# Patient Record
Sex: FEMALE | Race: WHITE | NOT HISPANIC OR LATINO | Employment: FULL TIME | URBAN - METROPOLITAN AREA
[De-identification: names, ages, dates, MRNs, and addresses within clinical notes are randomized per-mention and may not be internally consistent; named-entity substitution may affect disease eponyms.]

---

## 2017-03-08 ENCOUNTER — APPOINTMENT (OUTPATIENT)
Dept: PHYSICAL THERAPY | Facility: CLINIC | Age: 47
End: 2017-03-08
Payer: COMMERCIAL

## 2017-03-08 PROCEDURE — 97110 THERAPEUTIC EXERCISES: CPT

## 2017-03-08 PROCEDURE — 97162 PT EVAL MOD COMPLEX 30 MIN: CPT

## 2017-03-13 ENCOUNTER — APPOINTMENT (OUTPATIENT)
Dept: PHYSICAL THERAPY | Facility: CLINIC | Age: 47
End: 2017-03-13
Payer: COMMERCIAL

## 2017-03-13 PROCEDURE — 97140 MANUAL THERAPY 1/> REGIONS: CPT

## 2017-03-13 PROCEDURE — 97110 THERAPEUTIC EXERCISES: CPT

## 2017-03-15 ENCOUNTER — APPOINTMENT (OUTPATIENT)
Dept: PHYSICAL THERAPY | Facility: CLINIC | Age: 47
End: 2017-03-15
Payer: COMMERCIAL

## 2017-03-17 ENCOUNTER — APPOINTMENT (OUTPATIENT)
Dept: PHYSICAL THERAPY | Facility: CLINIC | Age: 47
End: 2017-03-17
Payer: COMMERCIAL

## 2019-05-14 ENCOUNTER — TELEPHONE (OUTPATIENT)
Dept: GASTROENTEROLOGY | Facility: MEDICAL CENTER | Age: 49
End: 2019-05-14

## 2019-05-17 ENCOUNTER — OFFICE VISIT (OUTPATIENT)
Dept: GASTROENTEROLOGY | Facility: HOSPITAL | Age: 49
End: 2019-05-17
Payer: COMMERCIAL

## 2019-05-17 VITALS
BODY MASS INDEX: 24.1 KG/M2 | HEIGHT: 68 IN | DIASTOLIC BLOOD PRESSURE: 77 MMHG | TEMPERATURE: 98.1 F | SYSTOLIC BLOOD PRESSURE: 118 MMHG | WEIGHT: 159 LBS | HEART RATE: 76 BPM

## 2019-05-17 DIAGNOSIS — K59.09 OTHER CONSTIPATION: ICD-10-CM

## 2019-05-17 DIAGNOSIS — R19.7 DIARRHEA, UNSPECIFIED TYPE: Primary | ICD-10-CM

## 2019-05-17 DIAGNOSIS — K21.00 GASTROESOPHAGEAL REFLUX DISEASE WITH ESOPHAGITIS: ICD-10-CM

## 2019-05-17 DIAGNOSIS — R10.33 PERIUMBILICAL ABDOMINAL PAIN: ICD-10-CM

## 2019-05-17 PROCEDURE — 99244 OFF/OP CNSLTJ NEW/EST MOD 40: CPT | Performed by: PHYSICIAN ASSISTANT

## 2019-05-17 RX ORDER — OMEPRAZOLE 40 MG/1
CAPSULE, DELAYED RELEASE ORAL
COMMUNITY
End: 2019-05-17 | Stop reason: ALTCHOICE

## 2019-05-17 RX ORDER — CHLORAL HYDRATE 500 MG
CAPSULE ORAL
COMMUNITY
End: 2019-07-01

## 2019-05-17 RX ORDER — ESTRADIOL 10 UG/1
INSERT VAGINAL WEEKLY
COMMUNITY
Start: 2017-12-04

## 2019-05-17 RX ORDER — CHLORAL HYDRATE 500 MG
1000 CAPSULE ORAL
COMMUNITY
End: 2020-05-25 | Stop reason: ALTCHOICE

## 2019-05-17 RX ORDER — FLUOXETINE 20 MG/1
TABLET, FILM COATED ORAL
COMMUNITY
End: 2019-10-08 | Stop reason: HOSPADM

## 2019-05-17 RX ORDER — FLUOXETINE HYDROCHLORIDE 20 MG/1
CAPSULE ORAL
COMMUNITY
End: 2019-07-01

## 2019-05-17 RX ORDER — FLUOXETINE HYDROCHLORIDE 20 MG/1
CAPSULE ORAL
COMMUNITY
Start: 2018-01-10

## 2019-05-17 RX ORDER — LEVOTHYROXINE SODIUM 0.03 MG/1
TABLET ORAL
COMMUNITY
Start: 2019-03-18 | End: 2019-07-01

## 2019-05-17 RX ORDER — METHOCARBAMOL 500 MG/1
TABLET, FILM COATED ORAL
COMMUNITY
Start: 2019-04-30 | End: 2019-07-01

## 2019-05-17 RX ORDER — METOPROLOL SUCCINATE AND HYDROCHLOROTHIAZIDE 25; 12.5 MG/1; MG/1
TABLET ORAL
COMMUNITY
End: 2019-07-01

## 2019-05-17 RX ORDER — MOMETASONE FUROATE 50 UG/1
2 SPRAY, METERED NASAL
COMMUNITY
Start: 2017-02-20 | End: 2019-10-08 | Stop reason: HOSPADM

## 2019-05-17 RX ORDER — UBIDECARENONE 50 MG
CAPSULE ORAL
COMMUNITY
End: 2020-05-25 | Stop reason: ALTCHOICE

## 2019-05-17 RX ORDER — METRONIDAZOLE 10 MG/G
GEL TOPICAL
COMMUNITY
End: 2019-10-08 | Stop reason: HOSPADM

## 2019-05-17 RX ORDER — UBIDECARENONE 75 MG
100 CAPSULE ORAL
COMMUNITY
End: 2019-10-08 | Stop reason: HOSPADM

## 2019-05-17 RX ORDER — LEVOTHYROXINE SODIUM 0.03 MG/1
TABLET ORAL
COMMUNITY
Start: 2016-12-26 | End: 2019-10-08 | Stop reason: HOSPADM

## 2019-05-17 RX ORDER — ESTRADIOL 0.5 MG/1
10 TABLET ORAL
COMMUNITY
End: 2019-10-08 | Stop reason: HOSPADM

## 2019-05-17 RX ORDER — ESTRADIOL 0.5 MG/1
TABLET ORAL
COMMUNITY
End: 2019-07-01

## 2019-05-17 RX ORDER — ERGOCALCIFEROL (VITAMIN D2) 1250 MCG
50000 CAPSULE ORAL
COMMUNITY
End: 2019-10-08 | Stop reason: HOSPADM

## 2019-05-17 RX ORDER — METOPROLOL SUCCINATE 25 MG/1
TABLET, EXTENDED RELEASE ORAL
COMMUNITY
Start: 2018-01-10

## 2019-05-17 RX ORDER — PANTOPRAZOLE SODIUM 40 MG/1
TABLET, DELAYED RELEASE ORAL
COMMUNITY
Start: 2017-12-10 | End: 2019-10-08

## 2019-05-17 RX ORDER — CYCLOBENZAPRINE HCL 5 MG
TABLET ORAL
COMMUNITY
Start: 2017-12-26 | End: 2020-05-25 | Stop reason: ALTCHOICE

## 2019-05-17 RX ORDER — DEXTROMETHORPHAN HYDROBROMIDE AND PROMETHAZINE HYDROCHLORIDE 15; 6.25 MG/5ML; MG/5ML
5 SYRUP ORAL
COMMUNITY
Start: 2018-01-10 | End: 2019-10-08 | Stop reason: HOSPADM

## 2019-05-17 RX ORDER — METHOCARBAMOL 500 MG/1
TABLET, FILM COATED ORAL
COMMUNITY
Start: 2019-04-30 | End: 2020-05-25 | Stop reason: ALTCHOICE

## 2019-05-17 RX ORDER — CLOBETASOL PROPIONATE 0.5 MG/G
CREAM TOPICAL
COMMUNITY
Start: 2016-11-03 | End: 2019-10-08 | Stop reason: HOSPADM

## 2019-05-24 ENCOUNTER — TELEPHONE (OUTPATIENT)
Dept: GASTROENTEROLOGY | Facility: MEDICAL CENTER | Age: 49
End: 2019-05-24

## 2019-05-24 ENCOUNTER — HOSPITAL ENCOUNTER (OUTPATIENT)
Dept: PERIOP | Facility: HOSPITAL | Age: 49
Discharge: HOME | End: 2019-05-24

## 2019-07-01 ENCOUNTER — ANESTHESIA EVENT (OUTPATIENT)
Dept: PERIOP | Facility: HOSPITAL | Age: 49
End: 2019-07-01

## 2019-07-02 ENCOUNTER — HOSPITAL ENCOUNTER (OUTPATIENT)
Dept: PERIOP | Facility: HOSPITAL | Age: 49
Setting detail: OUTPATIENT SURGERY
Discharge: HOME | End: 2019-07-02
Attending: INTERNAL MEDICINE | Admitting: INTERNAL MEDICINE
Payer: COMMERCIAL

## 2019-07-02 ENCOUNTER — ANESTHESIA (OUTPATIENT)
Dept: PERIOP | Facility: HOSPITAL | Age: 49
End: 2019-07-02

## 2019-07-02 VITALS
DIASTOLIC BLOOD PRESSURE: 70 MMHG | HEART RATE: 74 BPM | HEIGHT: 68 IN | TEMPERATURE: 98.1 F | SYSTOLIC BLOOD PRESSURE: 132 MMHG | OXYGEN SATURATION: 98 % | BODY MASS INDEX: 24.25 KG/M2 | RESPIRATION RATE: 20 BRPM | WEIGHT: 160 LBS

## 2019-07-02 DIAGNOSIS — K21.00 GASTROESOPHAGEAL REFLUX DISEASE WITH ESOPHAGITIS: ICD-10-CM

## 2019-07-02 DIAGNOSIS — R19.7 DIARRHEA, UNSPECIFIED TYPE: ICD-10-CM

## 2019-07-02 PROCEDURE — 43239 EGD BIOPSY SINGLE/MULTIPLE: CPT | Performed by: INTERNAL MEDICINE

## 2019-07-02 PROCEDURE — 88305 TISSUE EXAM BY PATHOLOGIST: CPT | Performed by: PATHOLOGY

## 2019-07-02 PROCEDURE — 45380 COLONOSCOPY AND BIOPSY: CPT | Performed by: INTERNAL MEDICINE

## 2019-07-02 RX ORDER — PROPOFOL 10 MG/ML
INJECTION, EMULSION INTRAVENOUS AS NEEDED
Status: DISCONTINUED | OUTPATIENT
Start: 2019-07-02 | End: 2019-07-02 | Stop reason: SURG

## 2019-07-02 RX ORDER — SODIUM CHLORIDE, SODIUM LACTATE, POTASSIUM CHLORIDE, CALCIUM CHLORIDE 600; 310; 30; 20 MG/100ML; MG/100ML; MG/100ML; MG/100ML
125 INJECTION, SOLUTION INTRAVENOUS CONTINUOUS
Status: DISCONTINUED | OUTPATIENT
Start: 2019-07-02 | End: 2019-07-02

## 2019-07-02 RX ORDER — LIDOCAINE HYDROCHLORIDE 10 MG/ML
INJECTION, SOLUTION INFILTRATION; PERINEURAL AS NEEDED
Status: DISCONTINUED | OUTPATIENT
Start: 2019-07-02 | End: 2019-07-02 | Stop reason: SURG

## 2019-07-02 RX ADMIN — PROPOFOL 50 MG: 10 INJECTION, EMULSION INTRAVENOUS at 08:48

## 2019-07-02 RX ADMIN — SODIUM CHLORIDE, SODIUM LACTATE, POTASSIUM CHLORIDE, AND CALCIUM CHLORIDE 125 ML/HR: .6; .31; .03; .02 INJECTION, SOLUTION INTRAVENOUS at 07:48

## 2019-07-02 RX ADMIN — PROPOFOL 100 MG: 10 INJECTION, EMULSION INTRAVENOUS at 08:29

## 2019-07-02 RX ADMIN — PROPOFOL 50 MG: 10 INJECTION, EMULSION INTRAVENOUS at 08:35

## 2019-07-02 RX ADMIN — PROPOFOL 50 MG: 10 INJECTION, EMULSION INTRAVENOUS at 08:44

## 2019-07-02 RX ADMIN — LIDOCAINE HYDROCHLORIDE 100 MG: 10 INJECTION, SOLUTION INFILTRATION; PERINEURAL at 08:29

## 2019-07-02 RX ADMIN — PROPOFOL 50 MG: 10 INJECTION, EMULSION INTRAVENOUS at 08:58

## 2019-07-02 RX ADMIN — PROPOFOL 50 MG: 10 INJECTION, EMULSION INTRAVENOUS at 08:41

## 2019-07-02 RX ADMIN — PROPOFOL 50 MG: 10 INJECTION, EMULSION INTRAVENOUS at 08:32

## 2019-07-02 RX ADMIN — PROPOFOL 50 MG: 10 INJECTION, EMULSION INTRAVENOUS at 08:53

## 2019-07-02 RX ADMIN — PROPOFOL 50 MG: 10 INJECTION, EMULSION INTRAVENOUS at 08:38

## 2019-07-02 NOTE — ANESTHESIA POSTPROCEDURE EVALUATION
Post-Op Assessment Note    CV Status:  Stable       Mental Status:  Sleepy   Hydration Status:  Stable   PONV Controlled:  None   Airway Patency:  Patent   Post Op Vitals Reviewed: Yes      Staff: CRNA           BP   93/61   Temp     Pulse  65   Resp   16   SpO2   98%

## 2019-07-02 NOTE — ANESTHESIA PREPROCEDURE EVALUATION
Review of Systems/Medical History  Patient summary reviewed  Chart reviewed  History of anesthetic complications difficult airway    Cardiovascular  EKG reviewed, Valvular heart disease , mitral valve prolapse,    Pulmonary  Negative pulmonary ROS        GI/Hepatic    GERD , Bowel prep            Endo/Other  History of thyroid disease ,      GYN       Hematology  Negative hematology ROS      Musculoskeletal  Negative musculoskeletal ROS        Neurology  Negative neurology ROS      Psychology   Negative psychology ROS              Physical Exam    Airway    Mallampati score: II  TM Distance: <3 FB  Neck ROM: full     Dental   No notable dental hx     Cardiovascular      Pulmonary      Other Findings        Anesthesia Plan  ASA Score- 2     Anesthesia Type- IV sedation with anesthesia with ASA Monitors  Additional Monitors:   Airway Plan:     Comment: Pt states difficult intubation history: difficulty during lap ankur  Plan Factors-  Patient did not smoke on day of surgery  Induction-     Postoperative Plan-     Informed Consent- Anesthetic plan and risks discussed with patient  I personally reviewed this patient with the CRNA  Discussed and agreed on the Anesthesia Plan with the CRNA  Drew Mora

## 2019-07-02 NOTE — H&P
History and Physical - [facility]  Kaylin Tafoya 47 y o  female MRN: [id number]                  HPI: Kaylin Tafoya is a 47y o  year old female who presents for EGD and colonoscopy  REVIEW OF SYSTEMS: Per the HPI, and otherwise unremarkable  Historical Information   Past Medical History:   Diagnosis Date    Colon polyp     stomach polyps    Disease of thyroid gland     GERD (gastroesophageal reflux disease)     MVP (mitral valve prolapse)      Past Surgical History:   Procedure Laterality Date    BACK SURGERY      CHOLECYSTECTOMY      HYSTERECTOMY      TRANSURETHRAL RESECTION OF BLADDER       Social History   Social History     Substance and Sexual Activity   Alcohol Use Yes    Comment: occasional     Social History     Substance and Sexual Activity   Drug Use Never     Social History     Tobacco Use   Smoking Status Never Smoker   Smokeless Tobacco Never Used     History reviewed  No pertinent family history  Meds/Allergies       (Not in a hospital admission)    No Known Allergies    Objective     Blood pressure 132/75, pulse 71, temperature (!) 97 2 °F (36 2 °C), temperature source Tympanic, resp  rate 18, height 5' 8" (1 727 m), weight 72 6 kg (160 lb), SpO2 98 %  PHYSICAL EXAM    Gen: NAD  CV: RRR  CHEST: Clear  ABD: soft, NT/ND  EXT: no edema      ASSESSMENT/PLAN:  This is a 47y o  year old female here for EGD and colonoscopy, and she is stable and optimized for her procedure

## 2019-08-30 ENCOUNTER — TRANSCRIBE ORDERS (OUTPATIENT)
Dept: ADMINISTRATIVE | Facility: HOSPITAL | Age: 49
End: 2019-08-30

## 2019-08-30 ENCOUNTER — APPOINTMENT (OUTPATIENT)
Dept: LAB | Facility: HOSPITAL | Age: 49
End: 2019-08-30
Payer: COMMERCIAL

## 2019-08-30 DIAGNOSIS — Z00.8 OTHER SPECIFIED GENERAL MEDICAL EXAMINATION: Primary | ICD-10-CM

## 2019-08-30 DIAGNOSIS — Z00.8 OTHER SPECIFIED GENERAL MEDICAL EXAMINATION: ICD-10-CM

## 2019-08-30 DIAGNOSIS — E03.9 ACQUIRED HYPOTHYROIDISM: ICD-10-CM

## 2019-08-30 DIAGNOSIS — I10 ESSENTIAL HYPERTENSION: Primary | ICD-10-CM

## 2019-08-30 DIAGNOSIS — I10 ESSENTIAL HYPERTENSION: ICD-10-CM

## 2019-08-30 DIAGNOSIS — Z00.8 HEALTH EXAMINATION IN POPULATION SURVEYS: ICD-10-CM

## 2019-08-30 LAB
ALBUMIN SERPL BCP-MCNC: 4.2 G/DL (ref 3.5–5)
ALP SERPL-CCNC: 48 U/L (ref 46–116)
ALT SERPL W P-5'-P-CCNC: 18 U/L (ref 12–78)
ANION GAP SERPL CALCULATED.3IONS-SCNC: 7 MMOL/L (ref 4–13)
AST SERPL W P-5'-P-CCNC: 14 U/L (ref 5–45)
BASOPHILS # BLD AUTO: 0.06 THOUSANDS/ΜL (ref 0–0.1)
BASOPHILS NFR BLD AUTO: 1 % (ref 0–1)
BILIRUB SERPL-MCNC: 0.4 MG/DL (ref 0.2–1)
BUN SERPL-MCNC: 12 MG/DL (ref 5–25)
CALCIUM SERPL-MCNC: 9.3 MG/DL (ref 8.3–10.1)
CHLORIDE SERPL-SCNC: 99 MMOL/L (ref 100–108)
CHOLEST SERPL-MCNC: 266 MG/DL (ref 50–200)
CHOLEST SERPL-MCNC: 269 MG/DL (ref 50–200)
CO2 SERPL-SCNC: 30 MMOL/L (ref 21–32)
CREAT SERPL-MCNC: 0.66 MG/DL (ref 0.6–1.3)
EOSINOPHIL # BLD AUTO: 0.25 THOUSAND/ΜL (ref 0–0.61)
EOSINOPHIL NFR BLD AUTO: 2 % (ref 0–6)
ERYTHROCYTE [DISTWIDTH] IN BLOOD BY AUTOMATED COUNT: 12.2 % (ref 11.6–15.1)
EST. AVERAGE GLUCOSE BLD GHB EST-MCNC: 103 MG/DL
GFR SERPL CREATININE-BSD FRML MDRD: 103 ML/MIN/1.73SQ M
GLUCOSE SERPL-MCNC: 88 MG/DL (ref 65–140)
HBA1C MFR BLD: 5.2 % (ref 4.2–6.3)
HCT VFR BLD AUTO: 40.8 % (ref 34.8–46.1)
HDLC SERPL-MCNC: 51 MG/DL (ref 40–60)
HDLC SERPL-MCNC: 52 MG/DL (ref 40–60)
HGB BLD-MCNC: 13.7 G/DL (ref 11.5–15.4)
IMM GRANULOCYTES # BLD AUTO: 0.21 THOUSAND/UL (ref 0–0.2)
IMM GRANULOCYTES NFR BLD AUTO: 2 % (ref 0–2)
LDLC SERPL CALC-MCNC: 174 MG/DL (ref 0–100)
LDLC SERPL CALC-MCNC: 175 MG/DL (ref 0–100)
LYMPHOCYTES # BLD AUTO: 3.03 THOUSANDS/ΜL (ref 0.6–4.47)
LYMPHOCYTES NFR BLD AUTO: 26 % (ref 14–44)
MCH RBC QN AUTO: 32.9 PG (ref 26.8–34.3)
MCHC RBC AUTO-ENTMCNC: 33.6 G/DL (ref 31.4–37.4)
MCV RBC AUTO: 98 FL (ref 82–98)
MONOCYTES # BLD AUTO: 0.78 THOUSAND/ΜL (ref 0.17–1.22)
MONOCYTES NFR BLD AUTO: 7 % (ref 4–12)
NEUTROPHILS # BLD AUTO: 7.35 THOUSANDS/ΜL (ref 1.85–7.62)
NEUTS SEG NFR BLD AUTO: 62 % (ref 43–75)
NONHDLC SERPL-MCNC: 215 MG/DL
NONHDLC SERPL-MCNC: 217 MG/DL
NRBC BLD AUTO-RTO: 0 /100 WBCS
PLATELET # BLD AUTO: 188 THOUSANDS/UL (ref 149–390)
PMV BLD AUTO: 9.9 FL (ref 8.9–12.7)
POTASSIUM SERPL-SCNC: 3.9 MMOL/L (ref 3.5–5.3)
PROT SERPL-MCNC: 7.7 G/DL (ref 6.4–8.2)
RBC # BLD AUTO: 4.17 MILLION/UL (ref 3.81–5.12)
SODIUM SERPL-SCNC: 136 MMOL/L (ref 136–145)
TRIGL SERPL-MCNC: 207 MG/DL
TRIGL SERPL-MCNC: 210 MG/DL
TSH SERPL DL<=0.05 MIU/L-ACNC: 2.73 UIU/ML (ref 0.36–3.74)
WBC # BLD AUTO: 11.68 THOUSAND/UL (ref 4.31–10.16)

## 2019-08-30 PROCEDURE — 80061 LIPID PANEL: CPT

## 2019-08-30 PROCEDURE — 80053 COMPREHEN METABOLIC PANEL: CPT

## 2019-08-30 PROCEDURE — 85025 COMPLETE CBC W/AUTO DIFF WBC: CPT

## 2019-08-30 PROCEDURE — 84443 ASSAY THYROID STIM HORMONE: CPT

## 2019-08-30 PROCEDURE — 36415 COLL VENOUS BLD VENIPUNCTURE: CPT

## 2019-08-30 PROCEDURE — 83036 HEMOGLOBIN GLYCOSYLATED A1C: CPT

## 2019-10-04 RX ORDER — CEPHALEXIN 500 MG/1
CAPSULE ORAL
COMMUNITY
Start: 2019-07-18 | End: 2019-10-08 | Stop reason: HOSPADM

## 2019-10-04 RX ORDER — ERYTHROMYCIN 5 MG/G
OINTMENT OPHTHALMIC
COMMUNITY
Start: 2019-07-05 | End: 2019-10-08 | Stop reason: HOSPADM

## 2019-10-08 ENCOUNTER — OFFICE VISIT (OUTPATIENT)
Dept: GASTROENTEROLOGY | Facility: CLINIC | Age: 49
End: 2019-10-08
Payer: COMMERCIAL

## 2019-10-08 VITALS
TEMPERATURE: 98.3 F | BODY MASS INDEX: 25.16 KG/M2 | SYSTOLIC BLOOD PRESSURE: 133 MMHG | HEIGHT: 68 IN | WEIGHT: 166 LBS | HEART RATE: 72 BPM | DIASTOLIC BLOOD PRESSURE: 76 MMHG

## 2019-10-08 DIAGNOSIS — K21.9 GASTROESOPHAGEAL REFLUX DISEASE WITHOUT ESOPHAGITIS: Primary | ICD-10-CM

## 2019-10-08 DIAGNOSIS — K58.2 IRRITABLE BOWEL SYNDROME WITH BOTH CONSTIPATION AND DIARRHEA: ICD-10-CM

## 2019-10-08 PROCEDURE — 99214 OFFICE O/P EST MOD 30 MIN: CPT | Performed by: PHYSICIAN ASSISTANT

## 2019-10-08 RX ORDER — ESOMEPRAZOLE MAGNESIUM 40 MG/1
40 CAPSULE, DELAYED RELEASE ORAL DAILY
Qty: 30 CAPSULE | Refills: 3 | Status: SHIPPED | OUTPATIENT
Start: 2019-10-08 | End: 2020-03-09

## 2019-10-08 RX ORDER — FAMOTIDINE 20 MG/1
20 TABLET, FILM COATED ORAL DAILY
COMMUNITY
End: 2020-05-25 | Stop reason: ALTCHOICE

## 2019-10-08 NOTE — PROGRESS NOTES
[facility] - Outpatient Follow-up Note  Brenda Robledo 47 y o  female MRN: [id number]  Encounter: [id number]          ASSESSMENT AND PLAN:      1  Gastroesophageal reflux disease without esophagitis:  She continues to complain of acid reflux symptoms  She is currently on Protonix 40 milligrams daily as well as Pepcid  She did undergo a recent EGD in May of 2019 that was negative except for a few gastric polyps with benign biopsies  Given her persistent symptoms despite PPI and H2 blocker and negative EGD, would recommend pH study for further evaluation off PPI to evaluate for non-acid vs acid reflux  She understands and agrees for procedure  In the meantime we could try switching her PPI to Nexium  - 24hr pH testing; Future  - esomeprazole (NexIUM) 40 MG capsule; Take 1 capsule (40 mg total) by mouth daily  Dispense: 30 capsule; Refill: 3  -long term side effects of PPIs were discussed    2  IBS-M:   She continues to complain of alternating bowel habits between diarrhea and constipation  She states this has been ongoing for about 20 years ever since she had her son  She did undergo colonoscopy May of 2019 that was normal   Repeat was recommended in 10 years  She does take a daily probiotic, which sounds like VSL and states she watches her diet  -low FODMAP diet handout given and discussed  -continue daily probiotic  -may use Imodium and MiraLax as needed for alternating bowel habits  -denies cramping and need for anti spasmodic    ______________________________________________________________________    SUBJECTIVE:  Brenda Robledo is a 46 yo female who is here for follow-up after EGD and colonoscopy as well as GERD  She states that she has a longstanding history of abdominal pain with alternating bowel habits with diarrhea and constipation  She thinks that she has irritable bowel syndrome  She denies significant abdominal cramping    She states that she does eat a "healthy" diet mostly consisting of meat and vegetables  She does drink the significant amount of caffeine  She also complains of heartburn/acid reflux symptoms  She has been on Protonix 40 milligrams daily for a long period of time and recently added Pepcid for worsening symptoms  She recently had an EGD and colonoscopy in May of 2019  EGD was normal except for a few gastric polyps with negative biopsies  Her colonoscopy was also normal and repeat was recommended in 10 years  Given her persistent symptoms despite PPI a normal EGD, would recommend pH study for further evaluation as this may be non-acid reflu      REVIEW OF SYSTEMS IS OTHERWISE NEGATIVE  Historical Information   Past Medical History:   Diagnosis Date    Colon polyp     stomach polyps    Disease of thyroid gland     GERD (gastroesophageal reflux disease)     MVP (mitral valve prolapse)      Past Surgical History:   Procedure Laterality Date    BACK SURGERY      CHOLECYSTECTOMY      HYSTERECTOMY      TRANSURETHRAL RESECTION OF BLADDER       Social History   Social History     Substance and Sexual Activity   Alcohol Use Yes    Comment: occasional     Social History     Substance and Sexual Activity   Drug Use Never     Social History     Tobacco Use   Smoking Status Never Smoker   Smokeless Tobacco Never Used     No family history on file      Meds/Allergies       Current Outpatient Medications:     Calcium-Magnesium-Zinc 167-83-8 MG TABS    Carboxymethylcell-Hypromellose (GENTEAL OP)    cyclobenzaprine (FLEXERIL) 5 mg tablet    estradiol (YUVAFEM) 10 MCG TABS vaginal tablet    famotidine (PEPCID) 20 mg tablet    FLUoxetine (PROzac) 20 mg capsule    levothyroxine 25 mcg/mL SUSP    methocarbamol (ROBAXIN) 500 mg tablet    metoprolol succinate (TOPROL-XL) 25 mg 24 hr tablet    Omega-3 Fatty Acids (FISH OIL) 1,000 mg    Red Yeast Rice 600 MG TABS    esomeprazole (NexIUM) 40 MG capsule    No Known Allergies        Objective     Blood pressure 133/76, pulse 72, temperature 98 3 °F (36 8 °C), temperature source Tympanic, height 5' 8" (1 727 m), weight 75 3 kg (166 lb)  Body mass index is 25 24 kg/m²  PHYSICAL EXAM:      General Appearance:   Alert, cooperative, no distress   HEENT:   Normocephalic, atraumatic, anicteric  Right eye exhibits no discharge  Left eye exhibits no discharge  No scleral icterus    Neck:  Supple, symmetrical, trachea midline, no stridor    Lungs:   Clear to auscultation bilaterally; no rales, rhonchi or wheezing; respirations unlabored    Heart[de-identified]   Regular rate and rhythm; no murmur, rub, or gallop  Abdomen:   Soft, mild epigastric discomfort, non-distended; normal bowel sounds; no masses, no organomegaly    Genitalia:   Deferred    Rectal:   Deferred    Extremities:  No cyanosis, clubbing or edema        Skin:  No jaundice, rashes, or lesions          Lab Results:   No visits with results within 1 Day(s) from this visit  Latest known visit with results is:   Appointment on 08/30/2019   Component Date Value    Hemoglobin A1C 08/30/2019 5 2     EAG 08/30/2019 103     Cholesterol 08/30/2019 269*    Triglycerides 08/30/2019 210*    HDL, Direct 08/30/2019 52     LDL Calculated 08/30/2019 175*    Non-HDL-Chol (CHOL-HDL) 08/30/2019 217          Radiology Results:   No results found

## 2019-10-08 NOTE — PATIENT INSTRUCTIONS
Gave a script for 24 hr PH testing  Central scheduling will call her to schedule this test   Patient will call when this gets scheduled so I can make follow up appointment

## 2019-11-11 DIAGNOSIS — K58.2 IRRITABLE BOWEL SYNDROME WITH BOTH CONSTIPATION AND DIARRHEA: ICD-10-CM

## 2019-11-11 DIAGNOSIS — K21.9 GASTROESOPHAGEAL REFLUX DISEASE WITHOUT ESOPHAGITIS: Primary | ICD-10-CM

## 2020-02-27 ENCOUNTER — TELEPHONE (OUTPATIENT)
Dept: UROLOGY | Facility: MEDICAL CENTER | Age: 50
End: 2020-02-27

## 2020-02-27 NOTE — TELEPHONE ENCOUNTER
Contacted and spoke with patient to schedule an appointment  Patient states 20 years ago she was diagnosed with bladder cancer  Low grade  Patient has not had a recurrence since that time  Patient was seeing Dr Sharyle Chen but patient's insurance changed and Dr Sharyle Chen does not take her insurance  Patient wishes YOLIE to Dr Eladio Colvin  Patient will obtain her records and have them fax  Patient scheduled 04/13/2020 at [address] [place] [address] with Dr Eladio Colvin in the [place] office

## 2020-02-27 NOTE — TELEPHONE ENCOUNTER
Complaints/diagnosis  New pt had transitional cell carcinoma over   Insurance:  [facility]  History of Cancer:  Low dose transition cell carcinoma 20 years ago  Previous Urologist:  Dr Rukhsana Davila and medical records are lost and dr Espinal Gist in [address] and pt will fax these records  Outside testing/where:  no  what kind of test:  no  Records requested/where:   In [facility]  Preferred Location  [place]

## 2020-02-27 NOTE — TELEPHONE ENCOUNTER
This is a new patient and wants to be seen in [place]  She had low dose of cancer 20 years ago for transitional cell carcinoma  Please call her back to schedule at [phone]  I was not sure if you need her to get any blood work before her appointment

## 2020-03-03 NOTE — TELEPHONE ENCOUNTER
Prior records received in the [facility] office and scanned to [address] [place] [address] Rd on 3-3-20

## 2020-03-06 DIAGNOSIS — K21.9 GASTROESOPHAGEAL REFLUX DISEASE WITHOUT ESOPHAGITIS: ICD-10-CM

## 2020-03-09 RX ORDER — ESOMEPRAZOLE MAGNESIUM 40 MG/1
CAPSULE, DELAYED RELEASE ORAL
Qty: 30 CAPSULE | Refills: 2 | Status: SHIPPED | OUTPATIENT
Start: 2020-03-09 | End: 2020-07-24

## 2020-04-03 ENCOUNTER — TELEPHONE (OUTPATIENT)
Dept: UROLOGY | Facility: CLINIC | Age: 50
End: 2020-04-03

## 2020-04-08 ENCOUNTER — TELEPHONE (OUTPATIENT)
Dept: UROLOGY | Facility: CLINIC | Age: 50
End: 2020-04-08

## 2020-04-09 ENCOUNTER — TELEPHONE (OUTPATIENT)
Dept: UROLOGY | Facility: CLINIC | Age: 50
End: 2020-04-09

## 2020-04-13 ENCOUNTER — TELEMEDICINE (OUTPATIENT)
Dept: UROLOGY | Facility: MEDICAL CENTER | Age: 50
End: 2020-04-13
Payer: COMMERCIAL

## 2020-04-13 VITALS — WEIGHT: 163 LBS | HEIGHT: 68 IN | BODY MASS INDEX: 24.71 KG/M2

## 2020-04-13 DIAGNOSIS — Z85.51 HISTORY OF BLADDER CANCER: Primary | ICD-10-CM

## 2020-04-13 PROCEDURE — 99201 PR OFFICE OUTPATIENT NEW 10 MINUTES: CPT | Performed by: UROLOGY

## 2020-04-13 RX ORDER — ICOSAPENT ETHYL 1000 MG/1
CAPSULE ORAL
COMMUNITY
End: 2020-05-25 | Stop reason: ALTCHOICE

## 2020-04-13 RX ORDER — ATORVASTATIN CALCIUM 10 MG/1
TABLET, FILM COATED ORAL
COMMUNITY
Start: 2020-02-27

## 2020-04-13 RX ORDER — MAG HYDROX/ALUMINUM HYD/SIMETH 400-400-40
SUSPENSION, ORAL (FINAL DOSE FORM) ORAL
COMMUNITY
Start: 2019-01-01

## 2020-05-25 ENCOUNTER — OFFICE VISIT (OUTPATIENT)
Dept: URGENT CARE | Facility: CLINIC | Age: 50
End: 2020-05-25
Payer: COMMERCIAL

## 2020-05-25 VITALS
DIASTOLIC BLOOD PRESSURE: 88 MMHG | OXYGEN SATURATION: 99 % | HEART RATE: 82 BPM | BODY MASS INDEX: 25.16 KG/M2 | WEIGHT: 166 LBS | SYSTOLIC BLOOD PRESSURE: 148 MMHG | RESPIRATION RATE: 20 BRPM | HEIGHT: 68 IN | TEMPERATURE: 99.7 F

## 2020-05-25 DIAGNOSIS — H61.22 IMPACTED CERUMEN OF LEFT EAR: Primary | ICD-10-CM

## 2020-05-25 DIAGNOSIS — H69.83 EUSTACHIAN TUBE DYSFUNCTION, BILATERAL: ICD-10-CM

## 2020-05-25 PROCEDURE — 69209 REMOVE IMPACTED EAR WAX UNI: CPT | Performed by: NURSE PRACTITIONER

## 2020-05-25 PROCEDURE — G0381 LEV 2 HOSP TYPE B ED VISIT: HCPCS | Performed by: NURSE PRACTITIONER

## 2020-07-17 ENCOUNTER — TELEPHONE (OUTPATIENT)
Dept: GASTROENTEROLOGY | Facility: CLINIC | Age: 50
End: 2020-07-17

## 2020-07-17 NOTE — TELEPHONE ENCOUNTER
----- Message from Lencho Lafleur sent at 7/17/2020 11:16 AM EDT -----  [place] patient  Please call and assist in scheduling FU     ----- Message -----  [facility]  Sent: 7/15/2020  11:31 AM EDT  To: , #    Please contact patient to schedule office visit/virtual visit in the next 1-2 weeks

## 2020-07-21 ENCOUNTER — TELEPHONE (OUTPATIENT)
Dept: GASTROENTEROLOGY | Facility: CLINIC | Age: 50
End: 2020-07-21

## 2020-07-21 DIAGNOSIS — K21.9 GASTROESOPHAGEAL REFLUX DISEASE WITHOUT ESOPHAGITIS: ICD-10-CM

## 2020-07-21 NOTE — TELEPHONE ENCOUNTER
Scheduled  ----- Message from Liz Maria sent at 7/17/2020  1:21 PM EDT -----  lmom for patient to contact office  ----- Message -----  From: Pina Serrano  Sent: 7/17/2020  11:16 AM EDT  To: [facility] [place] [facility]    [place] patient  Please call and assist in scheduling FU     ----- Message -----  From: Otoniel Jorgensen PA-C  Sent: 7/15/2020  11:31 AM EDT  To: , #    Please contact patient to schedule office visit/virtual visit in the next 1-2 weeks

## 2020-07-24 RX ORDER — ESOMEPRAZOLE MAGNESIUM 40 MG/1
CAPSULE, DELAYED RELEASE ORAL
Qty: 90 CAPSULE | Refills: 0 | Status: SHIPPED | OUTPATIENT
Start: 2020-07-24 | End: 2021-01-29 | Stop reason: SDUPTHER

## 2020-08-03 RX ORDER — ICOSAPENT ETHYL 1000 MG/1
2 CAPSULE ORAL 2 TIMES DAILY
COMMUNITY
Start: 2019-12-06

## 2020-08-04 ENCOUNTER — OFFICE VISIT (OUTPATIENT)
Dept: GASTROENTEROLOGY | Facility: CLINIC | Age: 50
End: 2020-08-04
Payer: COMMERCIAL

## 2020-08-04 VITALS
BODY MASS INDEX: 25.13 KG/M2 | SYSTOLIC BLOOD PRESSURE: 115 MMHG | HEIGHT: 68 IN | DIASTOLIC BLOOD PRESSURE: 77 MMHG | HEART RATE: 68 BPM | WEIGHT: 165.8 LBS | TEMPERATURE: 98.8 F

## 2020-08-04 DIAGNOSIS — K21.9 GASTROESOPHAGEAL REFLUX DISEASE WITHOUT ESOPHAGITIS: ICD-10-CM

## 2020-08-04 DIAGNOSIS — K58.2 IRRITABLE BOWEL SYNDROME WITH BOTH CONSTIPATION AND DIARRHEA: Primary | ICD-10-CM

## 2020-08-04 PROCEDURE — 99214 OFFICE O/P EST MOD 30 MIN: CPT | Performed by: PHYSICIAN ASSISTANT

## 2020-08-04 RX ORDER — DICYCLOMINE HCL 20 MG
20 TABLET ORAL EVERY 6 HOURS
Qty: 60 TABLET | Refills: 2 | Status: SHIPPED | OUTPATIENT
Start: 2020-08-04

## 2020-08-04 RX ORDER — SUCRALFATE ORAL 1 G/10ML
1 SUSPENSION ORAL 4 TIMES DAILY
Qty: 420 ML | Refills: 2 | Status: SHIPPED | OUTPATIENT
Start: 2020-08-04 | End: 2021-07-13 | Stop reason: SDUPTHER

## 2020-08-04 RX ORDER — LEVOTHYROXINE SODIUM 0.03 MG/1
TABLET ORAL
COMMUNITY
Start: 2020-07-22

## 2020-08-04 NOTE — PROGRESS NOTES
[facility] - Outpatient Follow-up Note  Liban Ureña 45 y o  female MRN: [id number]  Encounter: [id number]          ASSESSMENT AND PLAN:      1  Irritable bowel syndrome with both constipation and diarrhea: she has alternating bowel habits and occasional abdominal cramping  She had normal colonoscopy in 5/2019 and this is likely secondary to functional pain/IBS  She admits to recently being stressed due to covid and her  being laid off and she had abdominal pain, bloating and decreased appetite  Fortunately these symptoms have resolved  She would like to have bentyl on hand for the future  - dicyclomine (BENTYL) 20 mg tablet; Take 1 tablet (20 mg total) by mouth every 6 (six) hours  Dispense: 60 tablet; Refill: 2  -continue daily probiotic  -low fod map diet  -stress reduction including exercise and meditation or yoga    2  Gastroesophageal reflux disease without esophagitis: she is currently on nexium 40 mg daily  this was changed from protonix 40mg daily in 10/19  We were planning for pH study but her symptoms were improved with nexium  She would like to have carafate on hand for flares  - sucralfate (CARAFATE) 1 g/10 mL suspension; Take 10 mL (1 g total) by mouth 4 (four) times a day  Dispense: 420 mL; Refill: 2  -continue nexium 40mg daily   -gerd diet    F/u 6 months    ______________________________________________________________________    SUBJECTIVE:  Liban Ureña is a 79-year-old female who for follow-up of acid reflux and irritable bowel syndrome  She was last seen in October 2013 and was suffering from acid reflux  She was on Protonix 40 mg daily at that time and had added Pepcid for her symptoms  Her symptoms were stoma well controlled  We switched her to Nexium at that time and plan for pH study  After switching to Nexium, her symptoms completely resolved and she never needed to get the pH study completed    She was doing well up until a few weeks ago when she states she was significantly stressed due to [place] as well as her  being laid off because of COVID and she experienced abdominal bloating, pain, change in bowel habits, poor appetite  She made this appointment secondary to the symptoms but fortunately these have all now resolved  This may have been secondary to a virus or consider irritable bowel syndrome flare in the setting of stress  She no longer is experiencing the symptoms and denies any dysphagia, nausea, vomiting, abdominal pain, change in bowel habits, melena or hematochezia  Her last EGD and colonoscopy was in May 2019  EGD showed some gastric problems with negative biopsies  Colonoscopy was also normal and repeat was recommended in 10 years  REVIEW OF SYSTEMS IS OTHERWISE NEGATIVE        Historical Information   Past Medical History:   Diagnosis Date    Colon polyp     stomach polyps    Disease of thyroid gland     GERD (gastroesophageal reflux disease)     Hypertension     MVP (mitral valve prolapse)      Past Surgical History:   Procedure Laterality Date    BACK SURGERY      CHOLECYSTECTOMY      HYSTERECTOMY      TRANSURETHRAL RESECTION OF BLADDER       Social History   Social History     Substance and Sexual Activity   Alcohol Use Yes    Frequency: Monthly or less    Comment: occasional     Social History     Substance and Sexual Activity   Drug Use Never     Social History     Tobacco Use   Smoking Status Never Smoker   Smokeless Tobacco Never Used     Family History   Problem Relation Age of Onset    Diabetes Mother     Diabetes Father     Hypertension Father     Hyperlipidemia Father        Meds/Allergies       Current Outpatient Medications:     atorvastatin (LIPITOR) 10 mg tablet    Calcium-Magnesium-Zinc 167-83-8 MG TABS    Cholecalciferol (VITAMIN D3) 125 MCG (5000 UT) CAPS    esomeprazole (NexIUM) 40 MG capsule    estradiol (YUVAFEM) 10 MCG TABS vaginal tablet    Evening Primrose Oil 1000 MG CAPS   FLUoxetine (PROzac) 20 mg capsule    Icosapent Ethyl 1 g CAPS    levothyroxine 25 mcg/mL SUSP    LEVOTHYROXINE SODIUM PO    metoprolol succinate (TOPROL-XL) 25 mg 24 hr tablet    dicyclomine (BENTYL) 20 mg tablet    levothyroxine 25 mcg tablet    sucralfate (CARAFATE) 1 g/10 mL suspension    No Known Allergies        Objective     Blood pressure 115/77, pulse 68, temperature 98 8 °F (37 1 °C), temperature source Tympanic, height 5' 8", weight 75 2 kg (165 lb 12 8 oz)  Body mass index is 25 21 kg/m²  PHYSICAL EXAM:      General Appearance:   Alert, cooperative, no distress   HEENT:   Normocephalic, atraumatic, anicteric  Right eye exhibits no discharge  Left eye exhibits no discharge  No scleral icterus     Neck:  Supple, symmetrical, trachea midline, no stridor    Lungs:   Clear to auscultation bilaterally; no rales, rhonchi or wheezing; respirations unlabored    Heart[de-identified]   Regular rate and rhythm; no murmur, rub, or gallop  Abdomen:   Soft, non-tender, non-distended; normal bowel sounds; no masses, no organomegaly    Genitalia:   Deferred    Rectal:   Deferred    Extremities:  No cyanosis, clubbing or edema        Skin:  No jaundice, rashes, or lesions          Lab Results:   No visits with results within 1 Day(s) from this visit  Latest known visit with results is:   Appointment on 08/30/2019   Component Date Value    Hemoglobin A1C 08/30/2019 5 2     EAG 08/30/2019 103     Cholesterol 08/30/2019 269*    Triglycerides 08/30/2019 210*    HDL, Direct 08/30/2019 52     LDL Calculated 08/30/2019 175*    Non-HDL-Chol (CHOL-HDL) 08/30/2019 217          Radiology Results:   No results found

## 2020-12-01 ENCOUNTER — NURSE TRIAGE (OUTPATIENT)
Dept: OTHER | Facility: OTHER | Age: 50
End: 2020-12-01

## 2020-12-01 ENCOUNTER — AMB VIDEO VISIT (OUTPATIENT)
Dept: URGENT CARE | Age: 50
End: 2020-12-01

## 2020-12-01 DIAGNOSIS — R50.9 FEVER, UNSPECIFIED FEVER CAUSE: Primary | ICD-10-CM

## 2020-12-02 DIAGNOSIS — R50.9 FEVER, UNSPECIFIED FEVER CAUSE: ICD-10-CM

## 2020-12-02 PROCEDURE — U0003 INFECTIOUS AGENT DETECTION BY NUCLEIC ACID (DNA OR RNA); SEVERE ACUTE RESPIRATORY SYNDROME CORONAVIRUS 2 (SARS-COV-2) (CORONAVIRUS DISEASE [COVID-19]), AMPLIFIED PROBE TECHNIQUE, MAKING USE OF HIGH THROUGHPUT TECHNOLOGIES AS DESCRIBED BY CMS-2020-01-R: HCPCS | Performed by: PHYSICIAN ASSISTANT

## 2020-12-03 LAB — SARS-COV-2 RNA SPEC QL NAA+PROBE: NOT DETECTED

## 2020-12-16 ENCOUNTER — NURSE TRIAGE (OUTPATIENT)
Dept: OTHER | Facility: OTHER | Age: 50
End: 2020-12-16

## 2020-12-16 ENCOUNTER — TELEPHONE (OUTPATIENT)
Dept: OTHER | Facility: OTHER | Age: 50
End: 2020-12-16

## 2021-01-25 DIAGNOSIS — K21.9 GASTROESOPHAGEAL REFLUX DISEASE WITHOUT ESOPHAGITIS: ICD-10-CM

## 2021-01-29 ENCOUNTER — TELEPHONE (OUTPATIENT)
Dept: GASTROENTEROLOGY | Facility: CLINIC | Age: 51
End: 2021-01-29

## 2021-01-29 DIAGNOSIS — K21.9 GASTROESOPHAGEAL REFLUX DISEASE WITHOUT ESOPHAGITIS: ICD-10-CM

## 2021-01-29 RX ORDER — ESOMEPRAZOLE MAGNESIUM 40 MG/1
40 CAPSULE, DELAYED RELEASE ORAL DAILY
Qty: 90 CAPSULE | Refills: 0 | Status: SHIPPED | OUTPATIENT
Start: 2021-01-29 | End: 2021-04-13

## 2021-01-29 NOTE — TELEPHONE ENCOUNTER
GI Physician: Dr Huong Kulkarni for Medication: Nexium    Dose: 40 mg    Quantity: 90     [facility]

## 2021-02-05 RX ORDER — ESOMEPRAZOLE MAGNESIUM 40 MG/1
CAPSULE, DELAYED RELEASE ORAL
Qty: 90 CAPSULE | Refills: 1 | Status: SHIPPED | OUTPATIENT
Start: 2021-02-05 | End: 2021-04-13

## 2021-02-23 ENCOUNTER — TELEPHONE (OUTPATIENT)
Dept: GASTROENTEROLOGY | Facility: CLINIC | Age: 51
End: 2021-02-23

## 2021-02-23 NOTE — TELEPHONE ENCOUNTER
Called patient to reschedule appointment 3/18 due to schedule change with [facility]  Offered patient 3/17 with Dr Carrie Zamarripa  Patient will contact our office

## 2021-02-27 ENCOUNTER — LAB (OUTPATIENT)
Dept: LAB | Facility: HOSPITAL | Age: 51
End: 2021-02-27
Attending: UROLOGY
Payer: COMMERCIAL

## 2021-02-27 DIAGNOSIS — Z85.51 HISTORY OF BLADDER CANCER: ICD-10-CM

## 2021-02-27 LAB
BACTERIA UR QL AUTO: ABNORMAL /HPF
BILIRUB UR QL STRIP: NEGATIVE
CLARITY UR: ABNORMAL
COLOR UR: YELLOW
GLUCOSE UR STRIP-MCNC: NEGATIVE MG/DL
HGB UR QL STRIP.AUTO: NEGATIVE
KETONES UR STRIP-MCNC: NEGATIVE MG/DL
LEUKOCYTE ESTERASE UR QL STRIP: NEGATIVE
NITRITE UR QL STRIP: NEGATIVE
NON-SQ EPI CELLS URNS QL MICRO: ABNORMAL /HPF
PH UR STRIP.AUTO: 7 [PH]
PROT UR STRIP-MCNC: NEGATIVE MG/DL
RBC #/AREA URNS AUTO: ABNORMAL /HPF
SP GR UR STRIP.AUTO: <=1.005 (ref 1–1.03)
UROBILINOGEN UR QL STRIP.AUTO: 0.2 E.U./DL
WBC #/AREA URNS AUTO: ABNORMAL /HPF

## 2021-02-27 PROCEDURE — 81001 URINALYSIS AUTO W/SCOPE: CPT | Performed by: UROLOGY

## 2021-03-08 ENCOUNTER — TRANSCRIBE ORDERS (OUTPATIENT)
Dept: ADMINISTRATIVE | Facility: HOSPITAL | Age: 51
End: 2021-03-08

## 2021-03-08 DIAGNOSIS — Z12.31 ENCOUNTER FOR SCREENING MAMMOGRAM FOR MALIGNANT NEOPLASM OF BREAST: ICD-10-CM

## 2021-03-08 DIAGNOSIS — R92.2 BREAST DENSITY: Primary | ICD-10-CM

## 2021-03-08 RX ORDER — MOMETASONE FUROATE 1 MG/ML
SOLUTION TOPICAL
COMMUNITY
Start: 2021-01-28

## 2021-03-08 RX ORDER — KETOCONAZOLE 20 MG/ML
SHAMPOO TOPICAL
COMMUNITY
Start: 2021-01-28 | End: 2021-04-13

## 2021-03-09 ENCOUNTER — TELEMEDICINE (OUTPATIENT)
Dept: GASTROENTEROLOGY | Facility: CLINIC | Age: 51
End: 2021-03-09
Payer: COMMERCIAL

## 2021-03-09 DIAGNOSIS — K58.2 IRRITABLE BOWEL SYNDROME WITH BOTH CONSTIPATION AND DIARRHEA: Primary | ICD-10-CM

## 2021-03-09 DIAGNOSIS — K21.9 GASTROESOPHAGEAL REFLUX DISEASE WITHOUT ESOPHAGITIS: ICD-10-CM

## 2021-03-09 PROCEDURE — 99214 OFFICE O/P EST MOD 30 MIN: CPT | Performed by: INTERNAL MEDICINE

## 2021-03-09 RX ORDER — DEXLANSOPRAZOLE 60 MG/1
60 CAPSULE, DELAYED RELEASE ORAL DAILY
Qty: 30 CAPSULE | Refills: 5 | Status: SHIPPED | OUTPATIENT
Start: 2021-03-09 | End: 2021-08-02 | Stop reason: ALTCHOICE

## 2021-03-10 ENCOUNTER — TELEPHONE (OUTPATIENT)
Dept: GASTROENTEROLOGY | Facility: CLINIC | Age: 51
End: 2021-03-10

## 2021-03-10 NOTE — TELEPHONE ENCOUNTER
Patients GI provider:  Dr Kenroy Carrasco     Number to return call: N/A    Reason for call: Pharmacy calling statingdexlansoprazole (DEXILANT) 60 MG capsule is not on patients formulary and please send an alternative medication       Scheduled procedure/appointment date if applicable: N/A

## 2021-03-10 NOTE — TELEPHONE ENCOUNTER
Patient s/p telemedicine yesterday; IBS, GERD  Dexilant 60 mg daily recommended  [place] is not on formulary  Do you want a prior auth done or change medication (sorry, I can't open the office note to see why it was changed from nexium; I believe she also tried protonix in the past)  Thanks for your help

## 2021-03-11 NOTE — TELEPHONE ENCOUNTER
I left vm for patient that we we are checking for availability of samples  I also suggested she download the dexilant savings card from the website and give the numbers on the card to the pharamacy to see if it would apply  I asked on vm to please cb with update

## 2021-03-11 NOTE — PROGRESS NOTES
Virtual Regular Visit      Assessment/Plan:    Problem List Items Addressed This Visit     None      Visit Diagnoses     Irritable bowel syndrome with both constipation and diarrhea    -  Primary    Gastroesophageal reflux disease without esophagitis        Relevant Medications    dexlansoprazole (DEXILANT) 60 MG capsule        1  Irritable bowel syndrome with both constipation and diarrhea    I encouraged her to continue the dicyclomine as needed  She can also continue a low FODMAP diet and try to drink plenty of fluids and exercise regularly  2  Gastroesophageal reflux disease without esophagitis    Since her symptoms have persisted despite trying multiple proton pump inhibitors in the past and Nexium up to twice a day, I will try her on Dexilant daily  If there is no improvement in her symptoms on the Dexilant daily, then I will refer her for pH with impedance testing to assess for non acid reflux  She can also try simethicone as needed and we discussed some of the lifestyle and dietary modifications that can help   - dexlansoprazole (DEXILANT) 60 MG capsule; Take 1 capsule (60 mg total) by mouth daily  Dispense: 30 capsule; Refill: 5       Reason for visit is   Chief Complaint   Patient presents with    Virtual Regular Visit    Virtual Regular Visit        Encounter provider Hannah Longoria MD    Provider located at [address] [place] [address]  [address] [place] [id number] [phone]      Recent Visits  Date Type Provider Dept   03/09/21 Telemedicine Hannah Longoria MD [facility] recent visits within past 7 days and meeting all other requirements     Future Appointments  No visits were found meeting these conditions  Showing future appointments within next 150 days and meeting all other requirements        The patient was identified by name and date of birth   Joe Ro was informed that this is a telemedicine visit and that the visit is being conducted through [employer] and patient was informed that this is a secure, HIPAA-compliant platform  She agrees to proceed     My office door was closed  No one else was in the room  She acknowledged consent and understanding of privacy and security of the video platform  The patient has agreed to participate and understands they can discontinue the visit at any time  Patient is aware this is a billable service  Subjective  Joe Luna is a 45 y o  female   Presents for follow-up of her acid reflux and irritable bowel syndrome  She feels she continues to have significant reflux symptoms despite taking Nexium up to 2 times a day and the Carafate  She denies dysphagia, nausea, and vomiting  She has been taking dicyclomine as needed for her abdominal cramping and feels it is helping  She denies any bleeding or weight loss  She feels the lifestyle and dietary changes she has made have been helping          Past Medical History:   Diagnosis Date    Colon polyp     stomach polyps    Disease of thyroid gland     GERD (gastroesophageal reflux disease)     Hypertension     MVP (mitral valve prolapse)        Past Surgical History:   Procedure Laterality Date    BACK SURGERY      CHOLECYSTECTOMY      HYSTERECTOMY      TRANSURETHRAL RESECTION OF BLADDER         Current Outpatient Medications   Medication Sig Dispense Refill    atorvastatin (LIPITOR) 10 mg tablet       Calcium-Magnesium-Zinc 167-83-8 MG TABS       Cholecalciferol (VITAMIN D3) 125 MCG (5000 UT) CAPS       dexlansoprazole (DEXILANT) 60 MG capsule Take 1 capsule (60 mg total) by mouth daily 30 capsule 5    dicyclomine (BENTYL) 20 mg tablet Take 1 tablet (20 mg total) by mouth every 6 (six) hours 60 tablet 2    esomeprazole (NexIUM) 40 MG capsule TAKE ONE CAPSULE BY MOUTH EVERY DAY 90 capsule 1    esomeprazole (NexIUM) 40 MG capsule Take 1 capsule (40 mg total) by mouth daily 90 capsule 0    estradiol (YUVAFEM) 10 MCG TABS vaginal tablet Insert into the vagina once a week       Evening Primrose Oil 1000 MG CAPS       FLUoxetine (PROzac) 20 mg capsule       Icosapent Ethyl 1 g CAPS Take 2 g by mouth 2 (two) times a day      ketoconazole (NIZORAL) 2 % shampoo shampoo 2-3 times WEEKLY TO SCALP  let sit FOR 15 minutes THEN WASH      levothyroxine 25 mcg tablet       levothyroxine 25 mcg/mL SUSP       LEVOTHYROXINE SODIUM PO       metoprolol succinate (TOPROL-XL) 25 mg 24 hr tablet       mometasone (ELOCON) 0 1 % lotion APPLY NIGHTLY TO SCALP AS DIRECTED      sucralfate (CARAFATE) 1 g/10 mL suspension Take 10 mL (1 g total) by mouth 4 (four) times a day 420 mL 2     No current facility-administered medications for this visit  No Known Allergies    REVIEW OF SYSTEMS:    CONSTITUTIONAL: Denies any fever, chills, rigors, and weight loss  HEENT: No earache or tinnitus  Denies hearing loss or visual disturbances  CARDIOVASCULAR: No chest pain or palpitations  RESPIRATORY: Denies any cough, hemoptysis, shortness of breath or dyspnea on exertion  GASTROINTESTINAL: As noted in the History of Present Illness  GENITOURINARY: No problems with urination  Denies any hematuria or dysuria  NEUROLOGIC: No dizziness or vertigo, denies headaches  MUSCULOSKELETAL: Denies any muscle or joint pain  SKIN: Denies skin rashes or itching  ENDOCRINE: Denies excessive thirst  Denies intolerance to heat or cold  PSYCHOSOCIAL: Denies depression or anxiety  Denies any recent memory loss  PHYSICAL EXAMINATION:  Appearance and vitals taken from home devices    General Appearance:   Alert, cooperative, no distress   HEENT:  Normocephalic, atraumatic, anicteric  Neck supple, symmetrical, trachea midline  Lungs:   Equal chest rise and unlabored breathing, normal effort, no coughing  Cardiovascular:   No visualized JVD  Abdomen:   No abdominal distension  Skin:   No jaundice, rashes, or lesions  Musculoskeletal:   Normal range of motion visualized  Psych:  Normal affect and normal insight  Neuro:  Alert and appropriate  I spent 15 minutes directly with the patient during this visit      VIRTUAL VISIT DISCLAIMER    Santiago Mullins acknowledges that she has consented to an online visit or consultation  She understands that the online visit is based solely on information provided by her, and that, in the absence of a face-to-face physical evaluation by the physician, the diagnosis she receives is both limited and provisional in terms of accuracy and completeness  This is not intended to replace a full medical face-to-face evaluation by the physician  Santiago Mullins understands and accepts these terms

## 2021-03-11 NOTE — TELEPHONE ENCOUNTER
Hi,     Do we have samples of dexilant 60 mg to offer patient? If not, she should try the dexilant savings card which she can download from the website  Thanks for your help

## 2021-03-16 NOTE — TELEPHONE ENCOUNTER
Patient said she downloaded savings card and will call pharmacist to determine cost   If not affordable she wcb and we will submit a PA to her insurance for dexilant to attempt to get approved

## 2021-04-08 ENCOUNTER — TELEPHONE (OUTPATIENT)
Dept: GASTROENTEROLOGY | Facility: CLINIC | Age: 51
End: 2021-04-08

## 2021-04-08 NOTE — TELEPHONE ENCOUNTER
We received denial for Dexilant 60mg tabs    I called Clinical review line @ [phone]    I spoke with Dina Favre set up appt for Pharmacist to give us a call back @ 12pm /2pm for review    Patient has tried and failed 2 of there required meds, this was documented on Cover my meds    Patient tired    Nexium  Pepcid  Pantoprazole

## 2021-04-08 NOTE — TELEPHONE ENCOUNTER
We received call from Nikki with [facility]    We did review, Nikki is able to provide approval on Dexilant  will fax over approval letter    I called and spoke with patient we went over update of approval

## 2021-04-13 ENCOUNTER — TRANSCRIBE ORDERS (OUTPATIENT)
Dept: GASTROENTEROLOGY | Facility: CLINIC | Age: 51
End: 2021-04-13

## 2021-04-13 ENCOUNTER — OFFICE VISIT (OUTPATIENT)
Dept: OTOLARYNGOLOGY | Facility: CLINIC | Age: 51
End: 2021-04-13
Payer: COMMERCIAL

## 2021-04-13 VITALS
TEMPERATURE: 97.3 F | WEIGHT: 173 LBS | BODY MASS INDEX: 26.22 KG/M2 | DIASTOLIC BLOOD PRESSURE: 70 MMHG | HEART RATE: 77 BPM | HEIGHT: 68 IN | OXYGEN SATURATION: 96 % | SYSTOLIC BLOOD PRESSURE: 122 MMHG

## 2021-04-13 DIAGNOSIS — E04.1 THYROID NODULE: ICD-10-CM

## 2021-04-13 DIAGNOSIS — K21.9 GASTROESOPHAGEAL REFLUX DISEASE, UNSPECIFIED WHETHER ESOPHAGITIS PRESENT: ICD-10-CM

## 2021-04-13 DIAGNOSIS — K21.9 LARYNGOPHARYNGEAL REFLUX (LPR): Primary | ICD-10-CM

## 2021-04-13 PROCEDURE — 99204 OFFICE O/P NEW MOD 45 MIN: CPT | Performed by: OTOLARYNGOLOGY

## 2021-04-13 PROCEDURE — 31575 DIAGNOSTIC LARYNGOSCOPY: CPT | Performed by: OTOLARYNGOLOGY

## 2021-04-13 RX ORDER — FAMOTIDINE 40 MG/1
40 TABLET, FILM COATED ORAL
Qty: 30 TABLET | Refills: 1 | Status: SHIPPED | OUTPATIENT
Start: 2021-04-13

## 2021-04-13 RX ORDER — AMOXICILLIN 500 MG/1
TABLET, FILM COATED ORAL
COMMUNITY
Start: 2021-03-12

## 2021-04-13 NOTE — PROGRESS NOTES
Consultation - Otolaryngology - Head and Neck Surgery  Facial Plastic and Reconstructive Surgery  Yesy Allen 45 y o  female MRN: [id number]  Encounter: [id number]        Assessment/Plan:  1  Laryngopharyngeal reflux (LPR)  famotidine (PEPCID) 40 MG tablet   2  Gastroesophageal reflux disease, unspecified whether esophagitis present  famotidine (PEPCID) 40 MG tablet   3  Thyroid nodule  US thyroid       Laryngopharyngeal reflux: We discussed the ongoing findings of laryngopharyngeal reflux  We discussed the management of laryngopharyngeal reflux with PPI taken 30 minutes before the evening meal, elevation the head of bed, diet modifications, weight loss, and other lifestyle changes to assist with decreasing laryngopharyngeal reflux  She will start Dexilant tomorrow morning  Also advised starting on Pepcid at night before bed  She will continue following with GI for ongoing management as well  Will order repeat ultrasound thyroid as her last one was around 5 years ago  Follow up 6-8 weeks for re evaluation, sooner prn  History of Present Illness   Physician Requesting Consult: Eloisa Robertson MD  Reason for Consult / Principal Problem: Throat  HPI: Yesy Allen is a 45y o  year old female who presents with throat irritation  She has felt a sensation of something stuck on the left side of her throat for the last 2-3 weeks  She does not recall any inciting events or triggers  She has a history of Hashimoto's thyroiditis and had an ultrasound in 2016 demonstrating a small right lobe nodule  She also h ultrasound in 2016 has a history of GERD and follows with GI for management  She has tried numerous PPIs but still has reflux  Just recently was started on Dexilant - has not taken this yet  No tobacco  Infrequent alcohol  No weight loss, neck masses  Review of systems:  ROS was performed by the MA and documented in the attached note  This was reviewed personally      Historical Information   Past Medical History:   Diagnosis Date    Colon polyp     stomach polyps    Disease of thyroid gland     GERD (gastroesophageal reflux disease)     Hypertension     MVP (mitral valve prolapse)      Past Surgical History:   Procedure Laterality Date    BACK SURGERY      CHOLECYSTECTOMY      HYSTERECTOMY      TRANSURETHRAL RESECTION OF BLADDER       Social History   Social History     Substance and Sexual Activity   Alcohol Use Yes    Frequency: Monthly or less    Comment: occasional     Social History     Substance and Sexual Activity   Drug Use Never     Social History     Tobacco Use   Smoking Status Never Smoker   Smokeless Tobacco Never Used     Family History:   Family History   Problem Relation Age of Onset    Diabetes Mother     Diabetes Father     Hypertension Father     Hyperlipidemia Father        Current Outpatient Medications on File Prior to Visit   Medication Sig    amoxicillin (AMOXIL) 500 MG tablet TAKE 4 TABLETS one hour prior TO appointment    atorvastatin (LIPITOR) 10 mg tablet     Calcium-Magnesium-Zinc 167-83-8 MG TABS     Cholecalciferol (VITAMIN D3) 125 MCG (5000 UT) CAPS     dexlansoprazole (DEXILANT) 60 MG capsule Take 1 capsule (60 mg total) by mouth daily    dicyclomine (BENTYL) 20 mg tablet Take 1 tablet (20 mg total) by mouth every 6 (six) hours    estradiol (YUVAFEM) 10 MCG TABS vaginal tablet Insert into the vagina once a week     Evening Primrose Oil 1000 MG CAPS     FLUoxetine (PROzac) 20 mg capsule     levothyroxine 25 mcg tablet     metoprolol succinate (TOPROL-XL) 25 mg 24 hr tablet     sucralfate (CARAFATE) 1 g/10 mL suspension Take 10 mL (1 g total) by mouth 4 (four) times a day    Icosapent Ethyl 1 g CAPS Take 2 g by mouth 2 (two) times a day    LEVOTHYROXINE SODIUM PO     mometasone (ELOCON) 0 1 % lotion APPLY NIGHTLY TO SCALP AS DIRECTED    [DISCONTINUED] esomeprazole (NexIUM) 40 MG capsule TAKE ONE CAPSULE BY MOUTH EVERY DAY    [DISCONTINUED] esomeprazole (NexIUM) 40 MG capsule Take 1 capsule (40 mg total) by mouth daily    [DISCONTINUED] ketoconazole (NIZORAL) 2 % shampoo shampoo 2-3 times WEEKLY TO SCALP  let sit FOR 15 minutes THEN WASH    [DISCONTINUED] levothyroxine 25 mcg/mL SUSP      No current facility-administered medications on file prior to visit  No Known Allergies    Vitals:    04/13/21 0808   BP: 122/70   Pulse: 77   Temp: (!) 97 3 °F (36 3 °C)   SpO2: 96%       Physical Exam   Constitutional: Oriented to person, place, and time  Well-developed and well-nourished, no apparent distress, non-toxic appearance  Cooperative, able to hear and answer questions without difficulty  Voice: Normal voice quality  Head: Normocephalic, atraumatic  No scars, masses or lesions  Face: Symmetric, no edema, no sinus tenderness  Eyes: Vision grossly intact, extra-ocular movement intact  Ears: External ears normal  Tympanic membranes intact with intact normal landmarks  No post-auricular erythema or tenderness  Nose: Septum intact, nares clear  Mucosa moist, turbinates well appearing  No crusting, polyps or discharge evident  Oral cavity: Dentition intact  Mucosa moist, lips without lesions or masses  Tongue mobile, floor of mouth soft and flat  Hard palate intact  No masses or lesions  Oropharynx: Uvula is midline, soft palate intact without lesion or mass  Oropharyngeal inlet without obstruction  Tonsils unremarkable  Posterior pharyngeal wall clear  No masses or lesions  Salivary glands:  Parotid glands and submandibular glands symmetric, no enlargement or tenderness  Neck: Normal laryngeal elevation with swallow  Trachea midline  No masses or lesions  No palpable adenopathy  Thyroid: Without tenderness or palpable nodules  Pulmonary/Chest: Normal effort and rate  No respiratory distress  No stertor or stridor  Musculoskeletal: Normal range of motion  Neurological: Cranial nerves 2-12 intact    Skin: Skin is warm and dry    Psychiatric: Normal mood and affect  Procedure: Flexible fiberoptic laryngoscopy    Indications: Evaluate areas of the upper aerodigestive tract not seen on physical exam    Procedure in detail: After informed verbal consent was obtained the nose was anesthetized using 4% lidocaine and neosynephrine spray  After adequate time for anesthesia the scope was guided easily along the nasal cavity floor and into the nasopharynx  The nasopharynx, oropharynx, hypopharynx and larynx were evaluated with the below listed findings  The scope was removed without difficulty and the patient tolerated the procedure well  Findings: No significant mucopurulence or polyposis in bilateral nasal cavities  No lesions or masses of the nasopharynx, oropharynx, hypopharynx, or larynx  Bilateral vocal cords are full mobile  Moderate erythema of true and false vocal cords  Moderate cobblestoning present at posterior hypopharynx  Imaging Studies: I have personally reviewed pertinent reports  Lab Results: I have personally reviewed pertinent lab results

## 2021-04-16 ENCOUNTER — HOSPITAL ENCOUNTER (OUTPATIENT)
Dept: ULTRASOUND IMAGING | Facility: HOSPITAL | Age: 51
Discharge: HOME | End: 2021-04-16
Payer: COMMERCIAL

## 2021-04-16 DIAGNOSIS — E04.1 THYROID NODULE: ICD-10-CM

## 2021-04-16 PROCEDURE — 76536 US EXAM OF HEAD AND NECK: CPT

## 2021-06-15 ENCOUNTER — OFFICE VISIT (OUTPATIENT)
Dept: OTOLARYNGOLOGY | Facility: CLINIC | Age: 51
End: 2021-06-15
Payer: COMMERCIAL

## 2021-06-15 VITALS
DIASTOLIC BLOOD PRESSURE: 72 MMHG | BODY MASS INDEX: 25.91 KG/M2 | SYSTOLIC BLOOD PRESSURE: 130 MMHG | HEIGHT: 68 IN | HEART RATE: 93 BPM | OXYGEN SATURATION: 97 % | WEIGHT: 171 LBS | TEMPERATURE: 98.6 F

## 2021-06-15 DIAGNOSIS — K21.9 LARYNGOPHARYNGEAL REFLUX (LPR): Primary | ICD-10-CM

## 2021-06-15 DIAGNOSIS — K21.9 GASTROESOPHAGEAL REFLUX DISEASE, UNSPECIFIED WHETHER ESOPHAGITIS PRESENT: ICD-10-CM

## 2021-06-15 PROCEDURE — 99213 OFFICE O/P EST LOW 20 MIN: CPT | Performed by: OTOLARYNGOLOGY

## 2021-06-16 NOTE — PROGRESS NOTES
Raf Mckoy is a 45 y  o female who presents for re-evaluation of LPR/thyroid nodules  Thyroid ultrasound showed no discernable nodules  Continues to have throat symptoms as previously described  Prior FFL was unremarkable and though to have symptoms secondary to LPR  She was started on Dexilant in morning and Pepcid at night but reflux seemed to get worse with Dexilant  She resumed Nexium in its place but still feels bothersome reflux  Has been under increased stress lately  Follows with GI and is scheduled to return next month  Past Medical History:   Diagnosis Date    Colon polyp     stomach polyps    Disease of thyroid gland     GERD (gastroesophageal reflux disease)     Hypertension     MVP (mitral valve prolapse)        /72 (BP Location: Left arm, Cuff Size: Standard)   Pulse 93   Temp 98 6 °F (37 °C) (Temporal)   Ht 5' 8" (1 727 m)   Wt 77 6 kg (171 lb)   SpO2 97%   BMI 26 00 kg/m²       Physical Exam   Constitutional: Oriented to person, place, and time  Well-developed and well-nourished, no apparent distress, non-toxic appearance  Cooperative, able to hear and answer questions without difficulty  Voice: Normal voice quality  Head: Normocephalic, atraumatic  No scars, masses or lesions  Face: Symmetric, no edema, no sinus tenderness  Eyes: Vision grossly intact, extra-ocular movement intact  Ears: External ear normal   Bilateral tympanic membranes are intact with intact normal landmarks  No post-auricular erythema or tenderness  Nose: Septum midline, nares clear  Mucosa moist, turbinates well appearing  No crusting, polyps or discharge evident  Oral cavity: Dentition intact  Mucosa moist, lips normal   Tongue mobile, floor of mouth normal   Hard palate unremarkable  No masses or lesions  Oropharynx: Uvula is midline, soft palate normal   Unremarkable oropharyngeal inlet  Tonsils unremarkable  Posterior pharyngeal wall clear  No masses or lesions    Salivary glands: Parotid glands and submandibular glands symmetric, no enlargement or tenderness  Neck: Normal laryngeal elevation with swallow  Trachea midline  No masses or lesions  No palpable adenopathy  Thyroid: normal in size, unremarkable without tenderness or palpable nodules  Pulmonary/Chest: Normal effort and rate  No respiratory distress  Musculoskeletal: Normal range of motion  Neurological: Cranial nerves 2-12 intact  Skin: Skin is warm and dry  Psychiatric: Normal mood and affect  A/P: Laryngopharyngeal reflux: We discussed the ongoing findings of laryngopharyngeal reflux  We discussed the management of laryngopharyngeal reflux with PPI taken 30 minutes before the evening meal, elevation the head of bed, diet modifications, weight loss, and other lifestyle changes to assist with decreasing laryngopharyngeal reflux  She continues to have breakthrough reflux despite morning PPI and bedtime H2 blocker  Recommend returning to GI for ongoing management of reflux - discussed potential role for surgery  No notable thyroid nodules on ultrasound - can simply observe

## 2021-07-12 ENCOUNTER — HOSPITAL ENCOUNTER (OUTPATIENT)
Dept: MAMMOGRAPHY | Facility: CLINIC | Age: 51
Discharge: HOME | End: 2021-07-12
Payer: COMMERCIAL

## 2021-07-12 ENCOUNTER — HOSPITAL ENCOUNTER (OUTPATIENT)
Dept: ULTRASOUND IMAGING | Facility: CLINIC | Age: 51
Discharge: HOME | End: 2021-07-12
Payer: COMMERCIAL

## 2021-07-12 VITALS — BODY MASS INDEX: 25.91 KG/M2 | WEIGHT: 171 LBS | HEIGHT: 68 IN

## 2021-07-12 DIAGNOSIS — R92.2 BREAST DENSITY: ICD-10-CM

## 2021-07-12 DIAGNOSIS — Z12.31 ENCOUNTER FOR SCREENING MAMMOGRAM FOR MALIGNANT NEOPLASM OF BREAST: ICD-10-CM

## 2021-07-12 PROCEDURE — 76641 ULTRASOUND BREAST COMPLETE: CPT

## 2021-07-12 PROCEDURE — 77063 BREAST TOMOSYNTHESIS BI: CPT

## 2021-07-12 PROCEDURE — 77067 SCR MAMMO BI INCL CAD: CPT

## 2021-07-13 ENCOUNTER — OFFICE VISIT (OUTPATIENT)
Dept: GASTROENTEROLOGY | Facility: CLINIC | Age: 51
End: 2021-07-13
Payer: COMMERCIAL

## 2021-07-13 VITALS
DIASTOLIC BLOOD PRESSURE: 75 MMHG | BODY MASS INDEX: 26.58 KG/M2 | HEIGHT: 68 IN | TEMPERATURE: 98.7 F | HEART RATE: 71 BPM | SYSTOLIC BLOOD PRESSURE: 124 MMHG | WEIGHT: 175.4 LBS

## 2021-07-13 DIAGNOSIS — K21.9 GASTROESOPHAGEAL REFLUX DISEASE WITHOUT ESOPHAGITIS: Primary | ICD-10-CM

## 2021-07-13 DIAGNOSIS — K58.2 IRRITABLE BOWEL SYNDROME WITH BOTH CONSTIPATION AND DIARRHEA: ICD-10-CM

## 2021-07-13 PROCEDURE — 99214 OFFICE O/P EST MOD 30 MIN: CPT | Performed by: INTERNAL MEDICINE

## 2021-07-13 RX ORDER — SUCRALFATE ORAL 1 G/10ML
1 SUSPENSION ORAL 4 TIMES DAILY
Qty: 420 ML | Refills: 2 | Status: SHIPPED | OUTPATIENT
Start: 2021-07-13

## 2021-07-13 NOTE — PROGRESS NOTES
[facility] - Outpatient Follow-up Note  Rand Gonzalez 45 y o  female MRN: [id number]  Encounter: [id number]          ASSESSMENT AND PLAN:      1  Gastroesophageal reflux disease without esophagitis  I encouraged her to continue the omeprazole daily and gave her handout detailing the lifestyle and dietary modifications that could help with the reflux symptoms  She can also continue to take Carafate as needed and I refilled this for her today  - sucralfate (CARAFATE) 1 g/10 mL suspension; Take 10 mL (1 g total) by mouth 4 (four) times a day  Dispense: 420 mL; Refill: 2    2  Irritable bowel syndrome with both constipation and diarrhea  She has irritable bowel syndrome with alternating diarrhea and constipation but this has been mild and well controlled with dietary and lifestyle modification  I encouraged her to continue the low FODMAP diet and dicyclomine as needed  ______________________________________________________________________    SUBJECTIVE:  She presents for follow-up of her reflux and irritable bowel syndrome with alternating constipation and diarrhea  She feels symptoms have been well controlled on omeprazole and Carafate as needed and she has not had any significant diarrhea or constipation but she has occasional very mild diarrhea alternating with constipation  She denies any severe abdominal pain and she denies bleeding and weight loss but says she has gained some weight since her last visit  REVIEW OF SYSTEMS IS OTHERWISE NEGATIVE        Historical Information   Past Medical History:   Diagnosis Date    Bladder cancer ([facility] [address] )     at age of 31   Areta Emmer Colon polyp     stomach polyps    Disease of thyroid gland     GERD (gastroesophageal reflux disease)     Hypertension     MVP (mitral valve prolapse)      Past Surgical History:   Procedure Laterality Date    BACK SURGERY      CHOLECYSTECTOMY      HYSTERECTOMY      TRANSURETHRAL RESECTION OF BLADDER       Social History   Social History     Substance and Sexual Activity   Alcohol Use Yes    Comment: occasional     Social History     Substance and Sexual Activity   Drug Use Never     Social History     Tobacco Use   Smoking Status Never Smoker   Smokeless Tobacco Never Used     Family History   Problem Relation Age of Onset    Diabetes Mother     Cancer Mother     Diabetes Father     Hypertension Father     Hyperlipidemia Father     Lymphoma Maternal Aunt        Meds/Allergies       Current Outpatient Medications:     amoxicillin (AMOXIL) 500 MG tablet    atorvastatin (LIPITOR) 10 mg tablet    Calcium-Magnesium-Zinc 167-83-8 MG TABS    Cholecalciferol (VITAMIN D3) 125 MCG (5000 UT) CAPS    dexlansoprazole (DEXILANT) 60 MG capsule    dicyclomine (BENTYL) 20 mg tablet    estradiol (YUVAFEM) 10 MCG TABS vaginal tablet    Evening Primrose Oil 1000 MG CAPS    famotidine (PEPCID) 40 MG tablet    FLUoxetine (PROzac) 20 mg capsule    Icosapent Ethyl 1 g CAPS    levothyroxine 25 mcg tablet    LEVOTHYROXINE SODIUM PO    metoprolol succinate (TOPROL-XL) 25 mg 24 hr tablet    mometasone (ELOCON) 0 1 % lotion    sucralfate (CARAFATE) 1 g/10 mL suspension    No Known Allergies        Objective     Blood pressure 124/75, pulse 71, temperature 98 7 °F (37 1 °C), temperature source Tympanic, height 5' 8" (1 727 m), weight 79 6 kg (175 lb 6 4 oz)  Body mass index is 26 67 kg/m²  PHYSICAL EXAM:      General Appearance:   Alert, cooperative, no distress   HEENT:   Normocephalic, atraumatic, anicteric      Neck:  Supple, symmetrical, trachea midline   Lungs:   Clear to auscultation bilaterally; no rales, rhonchi or wheezing; respirations unlabored    Heart[de-identified]   Regular rate and rhythm; no murmur, rub, or gallop     Abdomen:   Soft, mild epigastric tenderness, non-distended; normal bowel sounds; no masses, no organomegaly    Genitalia:   Deferred    Rectal:   Deferred    Extremities:  No cyanosis, clubbing or edema  Pulses:  2+ and symmetric    Skin:  No jaundice, rashes, or lesions    Lymph nodes:  No palpable cervical lymphadenopathy        Lab Results:   No visits with results within 1 Day(s) from this visit  Latest known visit with results is:   Orders Only on 12/02/2020   Component Date Value    SARS-CoV-2  12/02/2020 Not Detected          Radiology Results:   US breast screening bilateral complete (ABUS)    Result Date: 7/12/2021  Narrative: DIAGNOSIS: Breast density TECHNIQUE: Automated breast ultrasound images were obtained on the [employer] system  Imaging was obtained in standard projections including AP, lateral and medial for both breasts, inclusive of all four quadrants  COMPARISONS: None available  RELEVANT HISTORY: Family Breast Cancer History: No known family history of breast cancer  Family Medical History: No known relevant family medical history  Personal History: Hormone history includes estrogen replacement therapy  Surgical history includes hysterectomy  No known relevant medical history  RISK ASSESSMENT: 5 Year Tyrer-Cuzick: 1 25 % 10 Year Tyrer-Cuzick: 2 69 % Lifetime Tyrer-Cuzick: 10 55 % TISSUE COMPOSITION: Homogeneous background echotexture - fibroglandular INDICATION: Bryant Mcburney is a 45 y o  female with dense breasts presenting for automated breast ultrasound screening  FINDINGS: Bilateral There are no suspicious masses or areas of architectural distortion  Impression:  No evidence of malignancy  Automated breast ultrasound is an adjunct, not a replacement, for routine yearly screening mammography  ASSESSMENT/BI-RADS CATEGORY: Left: 1 - Negative Right: 1 - Negative Overall: 1 - Negative RECOMMENDATION:      - Routine screening mammogram in 1 year for both breasts   Workstation ID: [id number]  Answers for HPI/ROS submitted by the patient on 7/11/2021  anorexia: No  arthralgias: Yes  belching: No  constipation: Yes  diarrhea: No  dysuria: No  fever: No  flatus: No  frequency: Yes  headaches: Yes  hematochezia: No  hematuria: No  melena: No  myalgias: Yes  nausea:  No  weight loss: No  vomiting: No

## 2021-07-29 ENCOUNTER — HOSPITAL ENCOUNTER (OUTPATIENT)
Dept: MAMMOGRAPHY | Facility: CLINIC | Age: 51
Discharge: HOME | End: 2021-07-29
Payer: COMMERCIAL

## 2021-07-29 ENCOUNTER — HOSPITAL ENCOUNTER (OUTPATIENT)
Dept: ULTRASOUND IMAGING | Facility: CLINIC | Age: 51
Discharge: HOME | End: 2021-07-29
Payer: COMMERCIAL

## 2021-07-29 DIAGNOSIS — R92.8 ABNORMAL MAMMOGRAM: ICD-10-CM

## 2021-07-29 PROCEDURE — 76642 ULTRASOUND BREAST LIMITED: CPT

## 2021-07-29 PROCEDURE — G0279 TOMOSYNTHESIS, MAMMO: HCPCS

## 2021-07-29 PROCEDURE — 77065 DX MAMMO INCL CAD UNI: CPT

## 2021-08-04 ENCOUNTER — TELEPHONE (OUTPATIENT)
Dept: GASTROENTEROLOGY | Facility: CLINIC | Age: 51
End: 2021-08-04

## 2021-08-04 NOTE — TELEPHONE ENCOUNTER
----- Message from Liz Hall sent at 7/13/2021  4:21 PM EDT -----  6 month follow up January 2022 with Dr Mian Cortés

## 2021-09-04 ENCOUNTER — APPOINTMENT (OUTPATIENT)
Dept: LAB | Facility: HOSPITAL | Age: 51
End: 2021-09-04

## 2021-09-04 DIAGNOSIS — Z00.8 HEALTH EXAMINATION IN POPULATION SURVEY: ICD-10-CM

## 2021-09-04 LAB
CHOLEST SERPL-MCNC: 207 MG/DL (ref 50–200)
EST. AVERAGE GLUCOSE BLD GHB EST-MCNC: 117 MG/DL
HBA1C MFR BLD: 5.7 %
HDLC SERPL-MCNC: 43 MG/DL
LDLC SERPL CALC-MCNC: 128 MG/DL (ref 0–100)
NONHDLC SERPL-MCNC: 164 MG/DL
TRIGL SERPL-MCNC: 179 MG/DL

## 2021-09-04 PROCEDURE — 36415 COLL VENOUS BLD VENIPUNCTURE: CPT

## 2021-09-04 PROCEDURE — 83036 HEMOGLOBIN GLYCOSYLATED A1C: CPT

## 2021-09-04 PROCEDURE — 80061 LIPID PANEL: CPT

## 2021-10-26 ENCOUNTER — TELEPHONE (OUTPATIENT)
Dept: GASTROENTEROLOGY | Facility: CLINIC | Age: 51
End: 2021-10-26

## 2021-12-26 ENCOUNTER — NURSE TRIAGE (OUTPATIENT)
Dept: OTHER | Facility: OTHER | Age: 51
End: 2021-12-26

## 2021-12-26 DIAGNOSIS — Z11.59 SPECIAL SCREENING EXAMINATION FOR VIRAL DISEASE: Primary | ICD-10-CM

## 2021-12-26 PROCEDURE — 87636 SARSCOV2 & INF A&B AMP PRB: CPT | Performed by: FAMILY MEDICINE

## 2022-02-21 ENCOUNTER — OFFICE VISIT (OUTPATIENT)
Dept: GASTROENTEROLOGY | Facility: CLINIC | Age: 52
End: 2022-02-21
Payer: COMMERCIAL

## 2022-02-21 VITALS
WEIGHT: 172 LBS | BODY MASS INDEX: 26.07 KG/M2 | HEART RATE: 76 BPM | SYSTOLIC BLOOD PRESSURE: 105 MMHG | TEMPERATURE: 98.8 F | HEIGHT: 68 IN | DIASTOLIC BLOOD PRESSURE: 71 MMHG

## 2022-02-21 DIAGNOSIS — K58.9 IRRITABLE BOWEL SYNDROME, UNSPECIFIED TYPE: Primary | ICD-10-CM

## 2022-02-21 PROCEDURE — 99213 OFFICE O/P EST LOW 20 MIN: CPT | Performed by: INTERNAL MEDICINE

## 2022-02-21 NOTE — PROGRESS NOTES
[facility] - Outpatient Follow-up Note  Val Orourke 45 y o  female MRN: [id number]  Encounter: [id number]          ASSESSMENT AND PLAN:      1  Irritable bowel syndrome, unspecified type    This is a 62-year-old white female with history of irritable bowel syndrome and esophageal reflux  She currently is doing fairly well and her symptoms are well controlled  We discussed timing of follow-up colonoscopy and endoscopy neither of which she needs at the present time  She would like to follow-up in one year with us  I advised her if that that would be fine but that if she has any increase in her symptoms or any questions she should call us or make an early appointment  Thank you so much for referring this patient   ______________________________________________________________________    Jennifer Hudsonon returns for follow-up  Her symptoms are unchanged and she is doing fairly well with following the FODMAP diet although she does not follow it strictly  She has mild symptoms and otherwise seems satisfied with her lack of any severe symptoms  She also follows a lactose-free diet  If she does have history of esophageal reflux but her endoscopy was negative for esophagitis or Bhat's  Her last colonoscopy was about two years ago and was normal       REVIEW OF SYSTEMS IS OTHERWISE NEGATIVE        Historical Information   Past Medical History:   Diagnosis Date    Bladder cancer ([address] )     at age of 31   Jesus Dasaadia Colon polyp     stomach polyps    Disease of thyroid gland     GERD (gastroesophageal reflux disease)     Hypertension     MVP (mitral valve prolapse)      Past Surgical History:   Procedure Laterality Date    BACK SURGERY      CHOLECYSTECTOMY      HYSTERECTOMY      TRANSURETHRAL RESECTION OF BLADDER       Social History   Social History     Substance and Sexual Activity   Alcohol Use Yes    Comment: occasional     Social History     Substance and Sexual Activity Drug Use Never     Social History     Tobacco Use   Smoking Status Never Smoker   Smokeless Tobacco Never Used     Family History   Problem Relation Age of Onset    Diabetes Mother     Cancer Mother     Diabetes Father     Hypertension Father     Hyperlipidemia Father     No Known Problems Sister     No Known Problems Maternal Grandmother     No Known Problems Paternal Grandmother     No Known Problems Sister     No Known Problems Maternal Aunt     Lymphoma Maternal Aunt     No Known Problems Paternal Aunt     No Known Problems Paternal Aunt     Breast cancer Neg Hx        Meds/Allergies       Current Outpatient Medications:     amoxicillin (AMOXIL) 500 MG tablet    atorvastatin (LIPITOR) 10 mg tablet    Calcium-Magnesium-Zinc 167-83-8 MG TABS    Cholecalciferol (VITAMIN D3) 125 MCG (5000 UT) CAPS    dicyclomine (BENTYL) 20 mg tablet    esomeprazole (NexIUM) 40 MG capsule    estradiol (YUVAFEM) 10 MCG TABS vaginal tablet    Evening Primrose Oil 1000 MG CAPS    famotidine (PEPCID) 40 MG tablet    FLUoxetine (PROzac) 20 mg capsule    Icosapent Ethyl 1 g CAPS    levothyroxine 25 mcg tablet    LEVOTHYROXINE SODIUM PO    metoprolol succinate (TOPROL-XL) 25 mg 24 hr tablet    mometasone (ELOCON) 0 1 % lotion    sucralfate (CARAFATE) 1 g/10 mL suspension    No Known Allergies        Objective     Blood pressure 105/71, pulse 76, temperature 98 8 °F (37 1 °C), height 5' 8" (1 727 m), weight 78 kg (172 lb), not currently breastfeeding  Body mass index is 26 15 kg/m²  PHYSICAL EXAM:      General Appearance:   Alert, cooperative, no distress   HEENT:   Normocephalic, atraumatic, anicteric  Neck:  Supple, symmetrical, trachea midline   Lungs:   Clear to auscultation bilaterally; no rales, rhonchi or wheezing; respirations unlabored    Heart[de-identified]   Regular rate and rhythm; no murmur, rub, or gallop     Abdomen:   Soft, non-tender, non-distended; normal bowel sounds; no masses, no organomegaly Genitalia:   Deferred    Rectal:   Deferred    Extremities:  No cyanosis, clubbing or edema    Pulses:  2+ and symmetric    Skin:  No jaundice, rashes, or lesions    Lymph nodes:  No palpable cervical lymphadenopathy        Lab Results:   No visits with results within 1 Day(s) from this visit  Latest known visit with results is:   Orders Only on 12/26/2021   Component Date Value    SARS-CoV-2 12/26/2021 Positive*    INFLUENZA A PCR 12/26/2021 Negative     INFLUENZA B PCR 12/26/2021 Negative          Radiology Results:   No results found

## 2022-04-29 ENCOUNTER — APPOINTMENT (OUTPATIENT)
Dept: LAB | Facility: HOSPITAL | Age: 52
End: 2022-04-29
Payer: COMMERCIAL

## 2022-04-29 ENCOUNTER — HOSPITAL ENCOUNTER (OUTPATIENT)
Dept: RADIOLOGY | Facility: HOSPITAL | Age: 52
Discharge: HOME | End: 2022-04-29
Payer: COMMERCIAL

## 2022-04-29 DIAGNOSIS — M79.2 NEURALGIA: ICD-10-CM

## 2022-04-29 DIAGNOSIS — E06.3 HYPOTHYROIDISM DUE TO HASHIMOTO'S THYROIDITIS: ICD-10-CM

## 2022-04-29 DIAGNOSIS — R07.9 CHEST PAIN, UNSPECIFIED TYPE: ICD-10-CM

## 2022-04-29 DIAGNOSIS — E03.8 HYPOTHYROIDISM DUE TO HASHIMOTO'S THYROIDITIS: ICD-10-CM

## 2022-04-29 LAB
BASOPHILS # BLD AUTO: 0.05 THOUSANDS/ΜL (ref 0–0.1)
BASOPHILS NFR BLD AUTO: 1 % (ref 0–1)
EOSINOPHIL # BLD AUTO: 0.17 THOUSAND/ΜL (ref 0–0.61)
EOSINOPHIL NFR BLD AUTO: 3 % (ref 0–6)
ERYTHROCYTE [DISTWIDTH] IN BLOOD BY AUTOMATED COUNT: 12.2 % (ref 11.6–15.1)
HCT VFR BLD AUTO: 42.8 % (ref 34.8–46.1)
HGB BLD-MCNC: 14.5 G/DL (ref 11.5–15.4)
IMM GRANULOCYTES # BLD AUTO: 0.04 THOUSAND/UL (ref 0–0.2)
IMM GRANULOCYTES NFR BLD AUTO: 1 % (ref 0–2)
LYMPHOCYTES # BLD AUTO: 2 THOUSANDS/ΜL (ref 0.6–4.47)
LYMPHOCYTES NFR BLD AUTO: 37 % (ref 14–44)
MCH RBC QN AUTO: 32 PG (ref 26.8–34.3)
MCHC RBC AUTO-ENTMCNC: 33.9 G/DL (ref 31.4–37.4)
MCV RBC AUTO: 95 FL (ref 82–98)
MONOCYTES # BLD AUTO: 0.34 THOUSAND/ΜL (ref 0.17–1.22)
MONOCYTES NFR BLD AUTO: 6 % (ref 4–12)
NEUTROPHILS # BLD AUTO: 2.82 THOUSANDS/ΜL (ref 1.85–7.62)
NEUTS SEG NFR BLD AUTO: 52 % (ref 43–75)
NRBC BLD AUTO-RTO: 0 /100 WBCS
PLATELET # BLD AUTO: 204 THOUSANDS/UL (ref 149–390)
PMV BLD AUTO: 10 FL (ref 8.9–12.7)
RBC # BLD AUTO: 4.53 MILLION/UL (ref 3.81–5.12)
TSH SERPL DL<=0.05 MIU/L-ACNC: 2.64 UIU/ML (ref 0.45–4.5)
WBC # BLD AUTO: 5.42 THOUSAND/UL (ref 4.31–10.16)

## 2022-04-29 PROCEDURE — 72114 X-RAY EXAM L-S SPINE BENDING: CPT

## 2022-04-29 PROCEDURE — 84443 ASSAY THYROID STIM HORMONE: CPT

## 2022-04-29 PROCEDURE — 36415 COLL VENOUS BLD VENIPUNCTURE: CPT

## 2022-04-29 PROCEDURE — 85025 COMPLETE CBC W/AUTO DIFF WBC: CPT

## 2022-08-02 ENCOUNTER — APPOINTMENT (OUTPATIENT)
Dept: LAB | Facility: HOSPITAL | Age: 52
End: 2022-08-02

## 2022-08-02 DIAGNOSIS — Z00.8 HEALTH EXAMINATION IN POPULATION SURVEY: ICD-10-CM

## 2022-08-02 LAB
CHOLEST SERPL-MCNC: 245 MG/DL
HDLC SERPL-MCNC: 42 MG/DL
LDLC SERPL CALC-MCNC: 127 MG/DL (ref 0–100)
NONHDLC SERPL-MCNC: 203 MG/DL
TRIGL SERPL-MCNC: 380 MG/DL

## 2022-08-02 PROCEDURE — 80061 LIPID PANEL: CPT

## 2022-08-02 PROCEDURE — 83036 HEMOGLOBIN GLYCOSYLATED A1C: CPT

## 2022-08-02 PROCEDURE — 36415 COLL VENOUS BLD VENIPUNCTURE: CPT

## 2022-08-03 LAB
EST. AVERAGE GLUCOSE BLD GHB EST-MCNC: 111 MG/DL
HBA1C MFR BLD: 5.5 %

## 2022-11-28 DIAGNOSIS — K21.9 GASTROESOPHAGEAL REFLUX DISEASE WITHOUT ESOPHAGITIS: ICD-10-CM

## 2022-11-28 RX ORDER — ESOMEPRAZOLE MAGNESIUM 40 MG/1
CAPSULE, DELAYED RELEASE ORAL
Qty: 90 CAPSULE | Refills: 0 | Status: SHIPPED | OUTPATIENT
Start: 2022-11-28

## 2022-12-06 ENCOUNTER — HOSPITAL ENCOUNTER (OUTPATIENT)
Dept: MAMMOGRAPHY | Facility: HOSPITAL | Age: 52
Discharge: HOME | End: 2022-12-06

## 2022-12-06 VITALS — WEIGHT: 172 LBS | BODY MASS INDEX: 26.07 KG/M2 | HEIGHT: 68 IN

## 2022-12-06 DIAGNOSIS — Z12.31 ENCOUNTER FOR SCREENING MAMMOGRAM FOR MALIGNANT NEOPLASM OF BREAST: ICD-10-CM

## 2022-12-12 ENCOUNTER — HOSPITAL ENCOUNTER (OUTPATIENT)
Dept: ULTRASOUND IMAGING | Facility: CLINIC | Age: 52
Discharge: HOME | End: 2022-12-12

## 2022-12-12 ENCOUNTER — HOSPITAL ENCOUNTER (OUTPATIENT)
Dept: MAMMOGRAPHY | Facility: CLINIC | Age: 52
Discharge: HOME | End: 2022-12-12

## 2022-12-12 DIAGNOSIS — R92.8 ABNORMAL MAMMOGRAM: ICD-10-CM

## 2023-01-25 ENCOUNTER — OFFICE VISIT (OUTPATIENT)
Dept: GASTROENTEROLOGY | Facility: CLINIC | Age: 53
End: 2023-01-25

## 2023-01-25 VITALS
OXYGEN SATURATION: 97 % | HEART RATE: 91 BPM | BODY MASS INDEX: 25.94 KG/M2 | WEIGHT: 171.2 LBS | TEMPERATURE: 97.6 F | HEIGHT: 68 IN | SYSTOLIC BLOOD PRESSURE: 125 MMHG | DIASTOLIC BLOOD PRESSURE: 88 MMHG

## 2023-01-25 DIAGNOSIS — K21.9 GASTROESOPHAGEAL REFLUX DISEASE, UNSPECIFIED WHETHER ESOPHAGITIS PRESENT: ICD-10-CM

## 2023-01-25 DIAGNOSIS — K58.2 IRRITABLE BOWEL SYNDROME WITH BOTH CONSTIPATION AND DIARRHEA: ICD-10-CM

## 2023-01-25 DIAGNOSIS — R09.89 GLOBUS SENSATION: ICD-10-CM

## 2023-01-25 DIAGNOSIS — R10.13 EPIGASTRIC PAIN: Primary | ICD-10-CM

## 2023-01-25 RX ORDER — SUCRALFATE ORAL 1 G/10ML
1 SUSPENSION ORAL 4 TIMES DAILY
Qty: 420 ML | Refills: 2 | Status: SHIPPED | OUTPATIENT
Start: 2023-01-25

## 2023-01-25 RX ORDER — FAMOTIDINE 40 MG/1
40 TABLET, FILM COATED ORAL
Qty: 30 TABLET | Refills: 3 | Status: SHIPPED | OUTPATIENT
Start: 2023-01-25

## 2023-01-25 RX ORDER — DICYCLOMINE HCL 20 MG
20 TABLET ORAL EVERY 6 HOURS
Qty: 60 TABLET | Refills: 2 | Status: SHIPPED | OUTPATIENT
Start: 2023-01-25

## 2023-01-25 NOTE — H&P (VIEW-ONLY)
[facility] - Outpatient Follow-up Note  John Fofana 47 y o  female MRN: [id number]  Encounter: [id number]          ASSESSMENT AND PLAN:      1  Irritable bowel syndrome with both constipation and diarrhea    Patient has a history of mixed irritable bowel syndrome but states that she has not had trouble moving her bowels recently  She states that sometimes they are on the looser side but usually formed and not difficult to pass  She did use some Bentyl when she had abdominal pain over the past week with some relief  Will reorder at this time  She does follow a low FODMAP diet as best she can as well as a lactose-free diet     -Continue low FODMAP diet  -Continue lactose-free diet  - dicyclomine (BENTYL) 20 mg tablet; Take 1 tablet (20 mg total) by mouth every 6 (six) hours  Dispense: 60 tablet; Refill: 2    2  Gastroesophageal reflux disease, unspecified whether esophagitis present  3  Globus sensation  4  Epigastric pain    Patient has a history of GERD in which she has been taking Nexium 40 mg daily  She does report recently having an increase in acid reflux despite the medication  She has been taking Tums almost daily  She has taken Pepcid and Carafate in the past but has not been taking these currently  She does also report significant epigastric discomfort  She denies dysphagia but does report a globus sensation and a choking feeling at times  She denies nausea or vomiting  Her last EGD was in 2019 that revealed a few gastric polyps but otherwise normal   Given an increase in symptoms despite her medication and epigastric discomfort, would recommend EGD to evaluate for etiology such as but not limited to gastritis, H  pylori, peptic ulcer disease, EOE or esophagitis  Process, risks and benefits discussed, she is agreeable  We will also have patient restart Pepcid 40 mg at bedtime and Carafate as needed    Also discussed the possibility of endoscopic antireflux procedures in the future     -Continue Nexium 40 mg daily  - famotidine (PEPCID) 40 MG tablet; Take 1 tablet (40 mg total) by mouth daily at bedtime  Dispense: 30 tablet; Refill: 3  - sucralfate (CARAFATE) 1 g/10 mL suspension; Take 10 mL (1 g total) by mouth 4 (four) times a day  Dispense: 420 mL; Refill: 2  - EGD; Future    We will see patient back after procedure     ______________________________________________________________________    SUBJECTIVE:  Vishal Phan is a 26-year-old female that presents today for a follow-up of GERD and mixed irritable bowel syndrome  Her last EGD and colonoscopy were in May 2019  Her colonoscopy was normal and she was recommended for repeat in 10 years  Her EGD revealed a few gastric polyps but otherwise normal biopsies were negative  Patient reports that over the last week or so she has had epigastric pain that radiated throughout her abdomen  She states that it had subsided but did return yesterday  She also reports an increase in acid reflux despite taking Nexium 40 mg daily  She reports that she has been taking Tums most days  She denies any she denies dysphagia but does report a choking feeling at times as well as a globus sensation  She does report an increase in stress and believes that this may be contributing  She states that she did take Bentyl with little relief  She denies any significant constipation or diarrhea however does report that her stools are sometimes on the looser side  She does try to follow a low FODMAP diet and a lactose-free diet  She denies any bloody stools or melena  REVIEW OF SYSTEMS:  Review of Systems   HENT: Trouble swallowing: Choking sensation but no dysphagia  Gastrointestinal: Positive for abdominal pain (Epigastric that radiates throughout her abdomen)  Negative for abdominal distention, anal bleeding, blood in stool, constipation, nausea and vomiting  Diarrhea: Loose stools at times           Historical Information   Past Medical History:   Diagnosis Date   • Bladder cancer ([facility] [address] )     at age of 31   • Colon polyp     stomach polyps   • Disease of thyroid gland    • GERD (gastroesophageal reflux disease)    • Hypertension    • MVP (mitral valve prolapse)      Past Surgical History:   Procedure Laterality Date   • BACK SURGERY     • CHOLECYSTECTOMY     • HYSTERECTOMY     • TRANSURETHRAL RESECTION OF BLADDER       Social History   Social History     Substance and Sexual Activity   Alcohol Use Yes    Comment: occasional     Social History     Substance and Sexual Activity   Drug Use Never     Social History     Tobacco Use   Smoking Status Never   Smokeless Tobacco Never     Family History   Problem Relation Age of Onset   • Diabetes Mother    • Cancer Mother    • Diabetes Father    • Hypertension Father    • Hyperlipidemia Father    • No Known Problems Sister    • No Known Problems Maternal Grandmother    • No Known Problems Paternal Grandmother    • No Known Problems Sister    • No Known Problems Maternal Aunt    • Lymphoma Maternal Aunt    • No Known Problems Paternal Aunt    • No Known Problems Paternal Aunt    • Breast cancer Neg Hx        Meds/Allergies       Current Outpatient Medications:   •  amoxicillin (AMOXIL) 500 MG tablet  •  atorvastatin (LIPITOR) 10 mg tablet  •  Calcium-Magnesium-Zinc 167-83-8 MG TABS  •  Cholecalciferol (VITAMIN D3) 125 MCG (5000 UT) CAPS  •  dicyclomine (BENTYL) 20 mg tablet  •  esomeprazole (NexIUM) 40 MG capsule  •  estradiol (VAGIFEM, YUVAFEM) 10 MCG TABS vaginal tablet  •  Evening Primrose Oil 1000 MG CAPS  •  famotidine (PEPCID) 40 MG tablet  •  FLUoxetine (PROzac) 20 mg capsule  •  Icosapent Ethyl 1 g CAPS  •  levothyroxine 25 mcg tablet  •  LEVOTHYROXINE SODIUM PO  •  metoprolol succinate (TOPROL-XL) 25 mg 24 hr tablet  •  mometasone (ELOCON) 0 1 % lotion  •  sucralfate (CARAFATE) 1 g/10 mL suspension    No Known Allergies        Objective     Blood pressure 125/88, pulse 91, temperature 97 6 °F (36 4 °C), temperature source Tympanic, height 5' 8" (1 727 m), weight 77 7 kg (171 lb 3 2 oz), SpO2 97 %, not currently breastfeeding  Body mass index is 26 03 kg/m²  PHYSICAL EXAM:      General Appearance:   Alert, cooperative, no distress   HEENT:   Normocephalic, atraumatic, anicteric  Neck:  Supple, symmetrical, trachea midline   Lungs:   Clear to auscultation bilaterally; no rales, rhonchi or wheezing; respirations unlabored    Heart[de-identified]   Regular rate and rhythm; no murmur, rub, or gallop  Abdomen:   Soft, epigastric tender, non-distended; normal bowel sounds; no masses, no organomegaly    Genitalia:   Deferred    Rectal:   Deferred    Extremities:  No cyanosis, clubbing or edema    Skin:  No jaundice, rashes, or lesions             Lab Results:   No visits with results within 1 Day(s) from this visit  Latest known visit with results is:   Appointment on 08/02/2022   Component Date Value   • Hemoglobin A1C 08/02/2022 5 5    • EAG 08/02/2022 111    • Cholesterol 08/02/2022 245 (H)    • Triglycerides 08/02/2022 380 (H)    • HDL, Direct 08/02/2022 42 (L)    • LDL Calculated 08/02/2022 127 (H)    • Non-HDL-Chol (CHOL-HDL) 08/02/2022 203          Radiology Results:   No results found

## 2023-01-25 NOTE — PROGRESS NOTES
[facility] - Outpatient Follow-up Note  Indy Montero 47 y o  female MRN: [id number]  Encounter: [id number]          ASSESSMENT AND PLAN:      1  Irritable bowel syndrome with both constipation and diarrhea    Patient has a history of mixed irritable bowel syndrome but states that she has not had trouble moving her bowels recently  She states that sometimes they are on the looser side but usually formed and not difficult to pass  She did use some Bentyl when she had abdominal pain over the past week with some relief  Will reorder at this time  She does follow a low FODMAP diet as best she can as well as a lactose-free diet     -Continue low FODMAP diet  -Continue lactose-free diet  - dicyclomine (BENTYL) 20 mg tablet; Take 1 tablet (20 mg total) by mouth every 6 (six) hours  Dispense: 60 tablet; Refill: 2    2  Gastroesophageal reflux disease, unspecified whether esophagitis present  3  Globus sensation  4  Epigastric pain    Patient has a history of GERD in which she has been taking Nexium 40 mg daily  She does report recently having an increase in acid reflux despite the medication  She has been taking Tums almost daily  She has taken Pepcid and Carafate in the past but has not been taking these currently  She does also report significant epigastric discomfort  She denies dysphagia but does report a globus sensation and a choking feeling at times  She denies nausea or vomiting  Her last EGD was in 2019 that revealed a few gastric polyps but otherwise normal   Given an increase in symptoms despite her medication and epigastric discomfort, would recommend EGD to evaluate for etiology such as but not limited to gastritis, H  pylori, peptic ulcer disease, EOE or esophagitis  Process, risks and benefits discussed, she is agreeable  We will also have patient restart Pepcid 40 mg at bedtime and Carafate as needed    Also discussed the possibility of endoscopic antireflux procedures in the future     -Continue Nexium 40 mg daily  - famotidine (PEPCID) 40 MG tablet; Take 1 tablet (40 mg total) by mouth daily at bedtime  Dispense: 30 tablet; Refill: 3  - sucralfate (CARAFATE) 1 g/10 mL suspension; Take 10 mL (1 g total) by mouth 4 (four) times a day  Dispense: 420 mL; Refill: 2  - EGD; Future    We will see patient back after procedure     ______________________________________________________________________    SUBJECTIVE:  Kelton Cagle is a 48-year-old female that presents today for a follow-up of GERD and mixed irritable bowel syndrome  Her last EGD and colonoscopy were in May 2019  Her colonoscopy was normal and she was recommended for repeat in 10 years  Her EGD revealed a few gastric polyps but otherwise normal biopsies were negative  Patient reports that over the last week or so she has had epigastric pain that radiated throughout her abdomen  She states that it had subsided but did return yesterday  She also reports an increase in acid reflux despite taking Nexium 40 mg daily  She reports that she has been taking Tums most days  She denies any she denies dysphagia but does report a choking feeling at times as well as a globus sensation  She does report an increase in stress and believes that this may be contributing  She states that she did take Bentyl with little relief  She denies any significant constipation or diarrhea however does report that her stools are sometimes on the looser side  She does try to follow a low FODMAP diet and a lactose-free diet  She denies any bloody stools or melena  REVIEW OF SYSTEMS:  Review of Systems   HENT: Trouble swallowing: Choking sensation but no dysphagia  Gastrointestinal: Positive for abdominal pain (Epigastric that radiates throughout her abdomen)  Negative for abdominal distention, anal bleeding, blood in stool, constipation, nausea and vomiting  Diarrhea: Loose stools at times           Historical Information   Past Medical History:   Diagnosis Date   • Bladder cancer ([address] )     at age of 31   • Colon polyp     stomach polyps   • Disease of thyroid gland    • GERD (gastroesophageal reflux disease)    • Hypertension    • MVP (mitral valve prolapse)      Past Surgical History:   Procedure Laterality Date   • BACK SURGERY     • CHOLECYSTECTOMY     • HYSTERECTOMY     • TRANSURETHRAL RESECTION OF BLADDER       Social History   Social History     Substance and Sexual Activity   Alcohol Use Yes    Comment: occasional     Social History     Substance and Sexual Activity   Drug Use Never     Social History     Tobacco Use   Smoking Status Never   Smokeless Tobacco Never     Family History   Problem Relation Age of Onset   • Diabetes Mother    • Cancer Mother    • Diabetes Father    • Hypertension Father    • Hyperlipidemia Father    • No Known Problems Sister    • No Known Problems Maternal Grandmother    • No Known Problems Paternal Grandmother    • No Known Problems Sister    • No Known Problems Maternal Aunt    • Lymphoma Maternal Aunt    • No Known Problems Paternal Aunt    • No Known Problems Paternal Aunt    • Breast cancer Neg Hx        Meds/Allergies       Current Outpatient Medications:   •  amoxicillin (AMOXIL) 500 MG tablet  •  atorvastatin (LIPITOR) 10 mg tablet  •  Calcium-Magnesium-Zinc 167-83-8 MG TABS  •  Cholecalciferol (VITAMIN D3) 125 MCG (5000 UT) CAPS  •  dicyclomine (BENTYL) 20 mg tablet  •  esomeprazole (NexIUM) 40 MG capsule  •  estradiol (VAGIFEM, YUVAFEM) 10 MCG TABS vaginal tablet  •  Evening Primrose Oil 1000 MG CAPS  •  famotidine (PEPCID) 40 MG tablet  •  FLUoxetine (PROzac) 20 mg capsule  •  Icosapent Ethyl 1 g CAPS  •  levothyroxine 25 mcg tablet  •  LEVOTHYROXINE SODIUM PO  •  metoprolol succinate (TOPROL-XL) 25 mg 24 hr tablet  •  mometasone (ELOCON) 0 1 % lotion  •  sucralfate (CARAFATE) 1 g/10 mL suspension    No Known Allergies        Objective     Blood pressure 125/88, pulse 91, temperature 97 6 °F (36 4 °C), temperature source Tympanic, height 5' 8" (1 727 m), weight 77 7 kg (171 lb 3 2 oz), SpO2 97 %, not currently breastfeeding  Body mass index is 26 03 kg/m²  PHYSICAL EXAM:      General Appearance:   Alert, cooperative, no distress   HEENT:   Normocephalic, atraumatic, anicteric  Neck:  Supple, symmetrical, trachea midline   Lungs:   Clear to auscultation bilaterally; no rales, rhonchi or wheezing; respirations unlabored    Heart[de-identified]   Regular rate and rhythm; no murmur, rub, or gallop  Abdomen:   Soft, epigastric tender, non-distended; normal bowel sounds; no masses, no organomegaly    Genitalia:   Deferred    Rectal:   Deferred    Extremities:  No cyanosis, clubbing or edema    Skin:  No jaundice, rashes, or lesions             Lab Results:   No visits with results within 1 Day(s) from this visit  Latest known visit with results is:   Appointment on 08/02/2022   Component Date Value   • Hemoglobin A1C 08/02/2022 5 5    • EAG 08/02/2022 111    • Cholesterol 08/02/2022 245 (H)    • Triglycerides 08/02/2022 380 (H)    • HDL, Direct 08/02/2022 42 (L)    • LDL Calculated 08/02/2022 127 (H)    • Non-HDL-Chol (CHOL-HDL) 08/02/2022 203          Radiology Results:   No results found

## 2023-01-25 NOTE — PATIENT INSTRUCTIONS
Scheduled date of EGD(as of today): 2/8/2023  Physician performing EGD:Dr Zettie Galeazzi  Location of EGD: [address] [place] [address]  Instructions reviewed with patient by:  Veronica Martinez  Clearances: N/A   Follow up made

## 2023-02-07 ENCOUNTER — NURSE TRIAGE (OUTPATIENT)
Dept: OTHER | Facility: OTHER | Age: 53
End: 2023-02-07

## 2023-02-07 NOTE — TELEPHONE ENCOUNTER
Regarding: Prep questions  ----- Message from Filemon Blow sent at 2/7/2023  3:09 PM EST -----  "Can I have clear liquids before my EGD procedure?"

## 2023-02-07 NOTE — TELEPHONE ENCOUNTER
Reason for Disposition  • Information only question and nurse able to answer    Answer Assessment - Initial Assessment Questions  1  REASON FOR CALL or QUESTION: "What is your reason for calling today?" or "How can I best help you?" or "What question do you have that I can help answer?"      Patient would like to know if she can have clear liquids before her EGD  Reviewed Teams file instructions with patient and informed her that she can have clear liquids up until 4 hours prior to procedure time  Also sent instructions via [employer]    Patient was appreciative    Protocols used: INFORMATION ONLY CALL - NO TRIAGE-ADULT-[place]

## 2023-02-08 ENCOUNTER — ANESTHESIA (OUTPATIENT)
Dept: PERIOP | Facility: HOSPITAL | Age: 53
End: 2023-02-08

## 2023-02-08 ENCOUNTER — HOSPITAL ENCOUNTER (OUTPATIENT)
Dept: PERIOP | Facility: HOSPITAL | Age: 53
Setting detail: OUTPATIENT SURGERY
Discharge: HOME | End: 2023-02-08

## 2023-02-08 ENCOUNTER — ANESTHESIA EVENT (OUTPATIENT)
Dept: PERIOP | Facility: HOSPITAL | Age: 53
End: 2023-02-08

## 2023-02-08 VITALS
OXYGEN SATURATION: 98 % | RESPIRATION RATE: 18 BRPM | HEART RATE: 72 BPM | SYSTOLIC BLOOD PRESSURE: 112 MMHG | TEMPERATURE: 98 F | DIASTOLIC BLOOD PRESSURE: 70 MMHG

## 2023-02-08 DIAGNOSIS — K21.9 GASTROESOPHAGEAL REFLUX DISEASE, UNSPECIFIED WHETHER ESOPHAGITIS PRESENT: ICD-10-CM

## 2023-02-08 RX ORDER — SODIUM CHLORIDE, SODIUM LACTATE, POTASSIUM CHLORIDE, CALCIUM CHLORIDE 600; 310; 30; 20 MG/100ML; MG/100ML; MG/100ML; MG/100ML
INJECTION, SOLUTION INTRAVENOUS CONTINUOUS PRN
Status: DISCONTINUED | OUTPATIENT
Start: 2023-02-08 | End: 2023-02-08

## 2023-02-08 RX ORDER — FENTANYL CITRATE/PF 50 MCG/ML
25 SYRINGE (ML) INJECTION
Status: CANCELLED | OUTPATIENT
Start: 2023-02-08

## 2023-02-08 RX ORDER — LIDOCAINE HYDROCHLORIDE 10 MG/ML
0.5 INJECTION, SOLUTION EPIDURAL; INFILTRATION; INTRACAUDAL; PERINEURAL ONCE AS NEEDED
Status: DISCONTINUED | OUTPATIENT
Start: 2023-02-08 | End: 2023-02-12 | Stop reason: HOSPADM

## 2023-02-08 RX ORDER — HYDROMORPHONE HCL IN WATER/PF 6 MG/30 ML
0.2 PATIENT CONTROLLED ANALGESIA SYRINGE INTRAVENOUS
Status: DISCONTINUED | OUTPATIENT
Start: 2023-02-08 | End: 2023-02-12 | Stop reason: HOSPADM

## 2023-02-08 RX ORDER — ONDANSETRON 2 MG/ML
4 INJECTION INTRAMUSCULAR; INTRAVENOUS ONCE AS NEEDED
Status: CANCELLED | OUTPATIENT
Start: 2023-02-08

## 2023-02-08 RX ORDER — FENTANYL CITRATE/PF 50 MCG/ML
25 SYRINGE (ML) INJECTION
Status: DISCONTINUED | OUTPATIENT
Start: 2023-02-08 | End: 2023-02-12 | Stop reason: HOSPADM

## 2023-02-08 RX ORDER — LIDOCAINE HYDROCHLORIDE 10 MG/ML
0.5 INJECTION, SOLUTION EPIDURAL; INFILTRATION; INTRACAUDAL; PERINEURAL ONCE AS NEEDED
Status: CANCELLED | OUTPATIENT
Start: 2023-02-08

## 2023-02-08 RX ORDER — PROPOFOL 10 MG/ML
INJECTION, EMULSION INTRAVENOUS AS NEEDED
Status: DISCONTINUED | OUTPATIENT
Start: 2023-02-08 | End: 2023-02-08

## 2023-02-08 RX ORDER — HYDROMORPHONE HCL IN WATER/PF 6 MG/30 ML
0.2 PATIENT CONTROLLED ANALGESIA SYRINGE INTRAVENOUS
Status: CANCELLED | OUTPATIENT
Start: 2023-02-08

## 2023-02-08 RX ORDER — LIDOCAINE HYDROCHLORIDE 20 MG/ML
INJECTION, SOLUTION EPIDURAL; INFILTRATION; INTRACAUDAL; PERINEURAL AS NEEDED
Status: DISCONTINUED | OUTPATIENT
Start: 2023-02-08 | End: 2023-02-08

## 2023-02-08 RX ORDER — ONDANSETRON 2 MG/ML
4 INJECTION INTRAMUSCULAR; INTRAVENOUS ONCE AS NEEDED
Status: DISCONTINUED | OUTPATIENT
Start: 2023-02-08 | End: 2023-02-12 | Stop reason: HOSPADM

## 2023-02-08 RX ADMIN — SODIUM CHLORIDE, SODIUM LACTATE, POTASSIUM CHLORIDE, AND CALCIUM CHLORIDE: .6; .31; .03; .02 INJECTION, SOLUTION INTRAVENOUS at 13:24

## 2023-02-08 RX ADMIN — PROPOFOL 50 MG: 10 INJECTION, EMULSION INTRAVENOUS at 13:28

## 2023-02-08 RX ADMIN — PROPOFOL 50 MG: 10 INJECTION, EMULSION INTRAVENOUS at 13:29

## 2023-02-08 RX ADMIN — LIDOCAINE HYDROCHLORIDE 100 MG: 20 INJECTION, SOLUTION EPIDURAL; INFILTRATION; INTRACAUDAL; PERINEURAL at 13:27

## 2023-02-08 RX ADMIN — PROPOFOL 110 MG: 10 INJECTION, EMULSION INTRAVENOUS at 13:27

## 2023-02-08 RX ADMIN — PROPOFOL 50 MG: 10 INJECTION, EMULSION INTRAVENOUS at 13:30

## 2023-02-08 NOTE — ANESTHESIA POSTPROCEDURE EVALUATION
Post-Op Assessment Note    CV Status:  Stable    Pain management: adequate     Mental Status:  Alert and awake   Hydration Status:  Euvolemic   PONV Controlled:  Controlled   Airway Patency:  Patent      Post Op Vitals Reviewed: Yes      Staff: CRNA         No notable events documented      BP   94/51   Temp  97 4   Pulse  80   Resp   18   SpO2   98%

## 2023-02-08 NOTE — ANESTHESIA PREPROCEDURE EVALUATION
Procedure:  EGD    Relevant Problems   No relevant active problems     CAD/PCI/MI/CHF -- denies  COPD/ASTHMA/HENRRY -- denies  PROBS WITH PRIOR ANESTHESIA -- denies  NPO STATUS CONFIRMED    Lab Results   Component Value Date    SODIUM 136 08/30/2019    K 3 9 08/30/2019    BUN 12 08/30/2019    CREATININE 0 66 08/30/2019    EGFR 103 08/30/2019     Lab Results   Component Value Date    HGBA1C 5 5 08/02/2022       Lab Results   Component Value Date    HGB 14 5 04/29/2022    HGB 13 7 08/30/2019     04/29/2022     08/30/2019     Lab Results   Component Value Date    WBC 5 42 04/29/2022       Lab Results   Component Value Date    CREATININE 0 66 08/30/2019       No results found for: INR  No results found for: PTT    No results found for: LACTATE                           Physical Exam    Airway    Mallampati score: II         Dental   No notable dental hx     Cardiovascular      Pulmonary      Other Findings        Anesthesia Plan  ASA Score- 2     Anesthesia Type- IV sedation with anesthesia with ASA Monitors  Additional Monitors:   Airway Plan:     Comment: BRIDGER Crooks , have personally seen and evaluated the patient prior to anesthetic care  I have reviewed the pre-anesthetic record, and other medical records if appropriate to the anesthetic care  If a CRNA is involved in the case, I have reviewed the CRNA assessment, if present, and agree  Risks/benefits and alternatives discussed with patient including possible PONV, sore throat, and possibility of rare anesthetic and surgical emergencies          Plan Factors-    Chart reviewed  EKG reviewed  Imaging results reviewed  Existing labs reviewed  Patient summary reviewed  Patient instructed to abstain from smoking on day of procedure  Obstructive sleep apnea risk education given perioperatively  Induction-     Postoperative Plan-     Informed Consent- Anesthetic plan and risks discussed with patient    I personally reviewed this patient with the CRNA  Discussed and agreed on the Anesthesia Plan with the CRNA  Judy Aragon

## 2023-02-27 ENCOUNTER — APPOINTMENT (OUTPATIENT)
Dept: LAB | Facility: HOSPITAL | Age: 53
End: 2023-02-27

## 2023-02-27 ENCOUNTER — HOSPITAL ENCOUNTER (OUTPATIENT)
Dept: ULTRASOUND IMAGING | Facility: HOSPITAL | Age: 53
Discharge: HOME | End: 2023-02-27

## 2023-02-27 DIAGNOSIS — E05.00 TOXIC DIFFUSE GOITER WITH PRETIBIAL MYXEDEMA: ICD-10-CM

## 2023-02-27 DIAGNOSIS — R14.0 ABDOMINAL DISTENSION (GASEOUS): ICD-10-CM

## 2023-02-27 DIAGNOSIS — E03.8 TOXIC DIFFUSE GOITER WITH PRETIBIAL MYXEDEMA: ICD-10-CM

## 2023-02-27 DIAGNOSIS — E06.3 CHRONIC LYMPHOCYTIC THYROIDITIS: ICD-10-CM

## 2023-02-27 LAB
ALBUMIN SERPL BCP-MCNC: 4.2 G/DL (ref 3.5–5)
ALP SERPL-CCNC: 60 U/L (ref 34–104)
ALT SERPL W P-5'-P-CCNC: 43 U/L (ref 7–52)
ANION GAP SERPL CALCULATED.3IONS-SCNC: 7 MMOL/L (ref 4–13)
AST SERPL W P-5'-P-CCNC: 24 U/L (ref 13–39)
BASOPHILS # BLD AUTO: 0.06 THOUSANDS/ÂΜL (ref 0–0.1)
BASOPHILS NFR BLD AUTO: 1 % (ref 0–1)
BILIRUB SERPL-MCNC: 0.55 MG/DL (ref 0.2–1)
BUN SERPL-MCNC: 11 MG/DL (ref 5–25)
CALCIUM SERPL-MCNC: 9.2 MG/DL (ref 8.4–10.2)
CHLORIDE SERPL-SCNC: 100 MMOL/L (ref 96–108)
CO2 SERPL-SCNC: 29 MMOL/L (ref 21–32)
CREAT SERPL-MCNC: 0.77 MG/DL (ref 0.6–1.3)
EOSINOPHIL # BLD AUTO: 0.19 THOUSAND/ÂΜL (ref 0–0.61)
EOSINOPHIL NFR BLD AUTO: 3 % (ref 0–6)
ERYTHROCYTE [DISTWIDTH] IN BLOOD BY AUTOMATED COUNT: 12.7 % (ref 11.6–15.1)
GFR SERPL CREATININE-BSD FRML MDRD: 88 ML/MIN/1.73SQ M
GLUCOSE P FAST SERPL-MCNC: 96 MG/DL (ref 65–99)
HCT VFR BLD AUTO: 42.4 % (ref 34.8–46.1)
HGB BLD-MCNC: 13.9 G/DL (ref 11.5–15.4)
IMM GRANULOCYTES # BLD AUTO: 0.07 THOUSAND/UL (ref 0–0.2)
IMM GRANULOCYTES NFR BLD AUTO: 1 % (ref 0–2)
LYMPHOCYTES # BLD AUTO: 2.14 THOUSANDS/ÂΜL (ref 0.6–4.47)
LYMPHOCYTES NFR BLD AUTO: 35 % (ref 14–44)
MCH RBC QN AUTO: 31.5 PG (ref 26.8–34.3)
MCHC RBC AUTO-ENTMCNC: 32.8 G/DL (ref 31.4–37.4)
MCV RBC AUTO: 96 FL (ref 82–98)
MONOCYTES # BLD AUTO: 0.47 THOUSAND/ÂΜL (ref 0.17–1.22)
MONOCYTES NFR BLD AUTO: 8 % (ref 4–12)
NEUTROPHILS # BLD AUTO: 3.22 THOUSANDS/ÂΜL (ref 1.85–7.62)
NEUTS SEG NFR BLD AUTO: 52 % (ref 43–75)
NRBC BLD AUTO-RTO: 0 /100 WBCS
PLATELET # BLD AUTO: 196 THOUSANDS/UL (ref 149–390)
PMV BLD AUTO: 10.1 FL (ref 8.9–12.7)
POTASSIUM SERPL-SCNC: 3.8 MMOL/L (ref 3.5–5.3)
PROT SERPL-MCNC: 6.9 G/DL (ref 6.4–8.4)
RBC # BLD AUTO: 4.41 MILLION/UL (ref 3.81–5.12)
SODIUM SERPL-SCNC: 136 MMOL/L (ref 135–147)
TSH SERPL DL<=0.05 MIU/L-ACNC: 2.49 UIU/ML (ref 0.45–4.5)
WBC # BLD AUTO: 6.15 THOUSAND/UL (ref 4.31–10.16)

## 2023-05-21 DIAGNOSIS — K21.9 GASTROESOPHAGEAL REFLUX DISEASE WITHOUT ESOPHAGITIS: ICD-10-CM

## 2023-05-21 RX ORDER — ESOMEPRAZOLE MAGNESIUM 40 MG/1
CAPSULE, DELAYED RELEASE ORAL
Qty: 90 CAPSULE | Refills: 0 | Status: SHIPPED | OUTPATIENT
Start: 2023-05-21

## 2023-06-04 ENCOUNTER — OFFICE VISIT (OUTPATIENT)
Dept: URGENT CARE | Facility: CLINIC | Age: 53
End: 2023-06-04
Payer: COMMERCIAL

## 2023-06-04 VITALS
TEMPERATURE: 98 F | OXYGEN SATURATION: 98 % | RESPIRATION RATE: 18 BRPM | HEART RATE: 83 BPM | DIASTOLIC BLOOD PRESSURE: 68 MMHG | SYSTOLIC BLOOD PRESSURE: 118 MMHG | BODY MASS INDEX: 26.37 KG/M2 | HEIGHT: 68 IN | WEIGHT: 174 LBS

## 2023-06-04 DIAGNOSIS — L03.032 PARONYCHIA OF GREAT TOE OF LEFT FOOT: Primary | ICD-10-CM

## 2023-06-04 PROCEDURE — 99213 OFFICE O/P EST LOW 20 MIN: CPT

## 2023-06-04 RX ORDER — CEPHALEXIN 500 MG/1
500 CAPSULE ORAL EVERY 8 HOURS SCHEDULED
Qty: 21 CAPSULE | Refills: 0 | Status: SHIPPED | OUTPATIENT
Start: 2023-06-04 | End: 2023-06-04

## 2023-06-04 RX ORDER — CEPHALEXIN 500 MG/1
500 CAPSULE ORAL EVERY 8 HOURS SCHEDULED
Qty: 21 CAPSULE | Refills: 0 | Status: SHIPPED | OUTPATIENT
Start: 2023-06-04 | End: 2023-06-11

## 2023-06-04 NOTE — PATIENT INSTRUCTIONS
Take prescribed antibiotic as instructed  Tylenol or ibuprofen for any pain or fever  May do warm compresses 4-5 times daily with a clean washcloth to the left great toe  May also do warm soaks in a bucket or tub  Wear well fitting shoes  If no improvement, follow-up with family doctor  If symptoms worsen, return or go to the ER  Follow up with PCP in 3-5 days  Proceed to  ER if symptoms worsen  Paronychia   WHAT YOU NEED TO KNOW:   Paronychia is an infection of your nail fold caused by bacteria or a fungus  The nail fold is the skin around your nail  Paronychia may happen suddenly and last for 6 weeks or longer  You may have paronychia on more than 1 finger or toe  DISCHARGE INSTRUCTIONS:   Return to the emergency department if:   You have severe nail pain  The inflammation spreads to your hand or arm  Call your doctor if:   Your nail becomes loose, deformed, or falls off  You have a large abscess on your nail  You have questions or concerns about your condition or care  Medicines:   Td vaccine  is a booster shot used to help prevent tetanus and diphtheria  The Td booster may be given to adolescents and adults every 10 years or for certain wounds and injuries  Antibiotics: This medicine will help fight or prevent an infection  It may be given as a pill, cream, or ointment  Steroids: This medicine will help decrease inflammation  It may be given as a pill, cream, or ointment  Antifungal medicine: This medicine helps kill fungus that may be causing your infection  It may be given as a cream or ointment  NSAIDs:  These medicines decrease pain and swelling  NSAIDs are available without a doctor's order  Ask your healthcare provider which medicine is right for you  Ask how much to take and when to take it  Take as directed  NSAIDs can cause stomach bleeding and kidney problems if not taken correctly  Take your medicine as directed    Contact your healthcare provider if you think your medicine is not helping or if you have side effects  Tell your provider if you are allergic to any medicine  Keep a list of the medicines, vitamins, and herbs you take  Include the amounts, and when and why you take them  Bring the list or the pill bottles to follow-up visits  Carry your medicine list with you in case of an emergency  Self-care:   Soak your nail:  Soak your nail in a mixture of equal parts vinegar and water 3 or 4 times each day  This will help decrease inflammation  Apply a warm compress:  Soak a washcloth in warm water and place it on your nail  This will help decrease inflammation  Elevate:  Raise your nail above the level of your heart as often as you can  This will help decrease swelling and pain  Prop your nail on pillows or blankets to keep it elevated comfortably  Use lotion:  Apply lotion after you wash your hands  This will prevent your skin from becoming too dry  Prevent paronychia:   Avoid chemicals and allergens that may harm your skin and nails  This includes soaps, laundry detergents, and nail products  Keep your nails clean and dry  Avoid soaking your nails in water  Use cotton-lined rubber gloves or wear 2 rubber gloves if you work with food or water  The gloves will help protect your nail folds  Keep your nails short  Do not bite your nails, pick at your hangnails, suck your fingers, or wear fake nails  Bring your own nail tools when you go to the nail salon  Follow up with your doctor as directed:  Write down your questions so you remember to ask them during your visits  © Copyright Maryana Velasquez 2022 Information is for End User's use only and may not be sold, redistributed or otherwise used for commercial purposes  The above information is an  only  It is not intended as medical advice for individual conditions or treatments   Talk to your doctor, nurse or pharmacist before following any medical regimen to see if it is safe and effective for you

## 2023-06-04 NOTE — PROGRESS NOTES
[facility]        NAME: Karen Villatoro is a 46 y o  female  : 1970    MRN: [id number]  DATE: 2023  TIME: 10:28 AM    Assessment and Plan   Paronychia of great toe of left foot [L03 [id number]]  1  Paronychia of great toe of left foot  cephalexin (KEFLEX) 500 mg capsule    DISCONTINUED: cephalexin (KEFLEX) 500 mg capsule        Paronychia to the left side of the left great toe without any abscess formation  No recent injury  Started on Keflex and encouraged warm soaks/warm compresses  Given advice for at home remedies  Advised to follow-up with family doctor  Advised return over the ER if symptoms worsen  Patient Instructions     Take prescribed antibiotic as instructed  Tylenol or ibuprofen for any pain or fever  May do warm compresses 4-5 times daily with a clean washcloth to the left great toe  May also do warm soaks in a bucket or tub  Wear well fitting shoes  If no improvement, follow-up with family doctor  If symptoms worsen, return or go to the ER  Follow up with PCP in 3-5 days  Proceed to  ER if symptoms worsen  Chief Complaint     Chief Complaint   Patient presents with   • Toe Pain     Woke up last night with pain on left toe, aching intermittent pain, appears red, no open areas  Denies any injuries to area  Has taken ibuprofen otc with some relief  No fever  History of Present Illness       65-year-old female presents with pain and some redness to the left great toe that she noticed last night  PT states that the pain was sudden  Denies any injury or trauma to that toe  Denies any prior injuries  PT took some ibuprofen with some relief  Denies any drainage, numbness, tingling, fever, chills, chest pain, trouble breathing  Review of Systems   Review of Systems   Constitutional: Negative  HENT: Negative  Respiratory: Negative  Musculoskeletal: Positive for arthralgias  Skin: Positive for color change  Neurological: Negative  Current Medications       Current Outpatient Medications:   •  atorvastatin (LIPITOR) 10 mg tablet, , Disp: , Rfl:   •  cephalexin (KEFLEX) 500 mg capsule, Take 1 capsule (500 mg total) by mouth every 8 (eight) hours for 7 days, Disp: 21 capsule, Rfl: 0  •  Cholecalciferol (VITAMIN D3) 125 MCG (5000 UT) CAPS, , Disp: , Rfl:   •  esomeprazole (NexIUM) 40 MG capsule, TAKE ONE CAPSULE BY MOUTH EVERY DAY BEFORE BREAKFAST, Disp: 90 capsule, Rfl: 0  •  estradiol (VAGIFEM, YUVAFEM) 10 MCG TABS vaginal tablet, Insert into the vagina once a week , Disp: , Rfl:   •  FLUoxetine (PROzac) 20 mg capsule, , Disp: , Rfl:   •  levothyroxine 25 mcg tablet, , Disp: , Rfl:   •  metoprolol succinate (TOPROL-XL) 25 mg 24 hr tablet, , Disp: , Rfl:   •  amoxicillin (AMOXIL) 500 MG tablet, TAKE 4 TABLETS one hour prior TO appointment (Patient not taking: Reported on 6/4/2023), Disp: , Rfl:   •  Calcium-Magnesium-Zinc 167-83-8 MG TABS, , Disp: , Rfl:   •  dicyclomine (BENTYL) 20 mg tablet, Take 1 tablet (20 mg total) by mouth every 6 (six) hours (Patient not taking: Reported on 6/4/2023), Disp: 60 tablet, Rfl: 2  •  Evening Primrose Oil 1000 MG CAPS, , Disp: , Rfl:   •  famotidine (PEPCID) 40 MG tablet, Take 1 tablet (40 mg total) by mouth daily at bedtime (Patient not taking: Reported on 6/4/2023), Disp: 30 tablet, Rfl: 3  •  Icosapent Ethyl 1 g CAPS, Take 2 g by mouth 2 (two) times a day (Patient not taking: Reported on 6/4/2023), Disp: , Rfl:   •  LEVOTHYROXINE SODIUM PO, , Disp: , Rfl:   •  mometasone (ELOCON) 0 1 % lotion, APPLY NIGHTLY TO SCALP AS DIRECTED (Patient not taking: Reported on 6/4/2023), Disp: , Rfl:   •  sucralfate (CARAFATE) 1 g/10 mL suspension, Take 10 mL (1 g total) by mouth 4 (four) times a day (Patient not taking: Reported on 6/4/2023), Disp: 420 mL, Rfl: 2    Current Allergies     Allergies as of 06/04/2023   • (No Known Allergies)            The following portions of the patient's history were reviewed and "updated as appropriate: allergies, current medications, past family history, past medical history, past social history, past surgical history and problem list      Past Medical History:   Diagnosis Date   • Bladder cancer ([address] )     at age of 31   • Colon polyp     stomach polyps   • Disease of thyroid gland    • GERD (gastroesophageal reflux disease)    • Hypertension    • MVP (mitral valve prolapse)        Past Surgical History:   Procedure Laterality Date   • BACK SURGERY     • CHOLECYSTECTOMY     • HYSTERECTOMY     • TRANSURETHRAL RESECTION OF BLADDER         Family History   Problem Relation Age of Onset   • Diabetes Mother    • Cancer Mother    • Diabetes Father    • Hypertension Father    • Hyperlipidemia Father    • No Known Problems Sister    • No Known Problems Maternal Grandmother    • No Known Problems Paternal Grandmother    • No Known Problems Sister    • No Known Problems Maternal Aunt    • Lymphoma Maternal Aunt    • No Known Problems Paternal Aunt    • No Known Problems Paternal Aunt    • Breast cancer Neg Hx          Medications have been verified  Objective   /68   Pulse 83   Temp 98 °F (36 7 °C)   Resp 18   Ht 5' 8\" (1 727 m)   Wt 78 9 kg (174 lb)   SpO2 98%   BMI 26 46 kg/m²        Physical Exam     Physical Exam  Constitutional:       General: She is not in acute distress  Appearance: Normal appearance  She is not ill-appearing  HENT:      Head: Normocephalic and atraumatic  Eyes:      Extraocular Movements: Extraocular movements intact  Pupils: Pupils are equal, round, and reactive to light  Cardiovascular:      Rate and Rhythm: Normal rate and regular rhythm  Pulses: Normal pulses  Heart sounds: Normal heart sounds  Pulmonary:      Effort: Pulmonary effort is normal       Breath sounds: Normal breath sounds  Skin:     General: Skin is warm and dry  Capillary Refill: Capillary refill takes less than 2 seconds        Findings: Erythema (To the " lateral aspect of the left great toe with reproducible tenderness  No active drainage  No abscess formation  Equal sensation in all toes bilaterally  Symptoms consistent with a paronychia ) present  Neurological:      General: No focal deficit present  Mental Status: She is alert and oriented to person, place, and time     Psychiatric:         Mood and Affect: Mood normal          Behavior: Behavior normal

## 2023-08-01 ENCOUNTER — OFFICE VISIT (OUTPATIENT)
Dept: GASTROENTEROLOGY | Facility: CLINIC | Age: 53
End: 2023-08-01
Payer: COMMERCIAL

## 2023-08-01 VITALS
BODY MASS INDEX: 26.37 KG/M2 | SYSTOLIC BLOOD PRESSURE: 120 MMHG | WEIGHT: 174 LBS | OXYGEN SATURATION: 93 % | TEMPERATURE: 97.6 F | HEART RATE: 75 BPM | HEIGHT: 68 IN | DIASTOLIC BLOOD PRESSURE: 76 MMHG

## 2023-08-01 DIAGNOSIS — K76.0 STEATOSIS OF LIVER: ICD-10-CM

## 2023-08-01 DIAGNOSIS — K21.00 GASTROESOPHAGEAL REFLUX DISEASE WITH ESOPHAGITIS WITHOUT HEMORRHAGE: Primary | ICD-10-CM

## 2023-08-01 DIAGNOSIS — K58.2 IRRITABLE BOWEL SYNDROME WITH BOTH CONSTIPATION AND DIARRHEA: ICD-10-CM

## 2023-08-01 PROBLEM — K59.00 CONSTIPATION: Status: ACTIVE | Noted: 2023-08-01

## 2023-08-01 PROCEDURE — 99214 OFFICE O/P EST MOD 30 MIN: CPT | Performed by: NURSE PRACTITIONER

## 2023-08-01 RX ORDER — DICYCLOMINE HYDROCHLORIDE 10 MG/1
CAPSULE ORAL
Qty: 90 CAPSULE | Refills: 3 | Status: SHIPPED | OUTPATIENT
Start: 2023-08-01

## 2023-08-01 RX ORDER — FAMOTIDINE 40 MG/1
40 TABLET, FILM COATED ORAL
Qty: 90 TABLET | Refills: 0 | Status: SHIPPED | OUTPATIENT
Start: 2023-08-01

## 2023-08-01 RX ORDER — LINACLOTIDE 72 UG/1
CAPSULE, GELATIN COATED ORAL
Qty: 30 CAPSULE | Refills: 2 | Status: SHIPPED | OUTPATIENT
Start: 2023-08-01

## 2023-08-01 RX ORDER — ESOMEPRAZOLE MAGNESIUM 40 MG/1
40 CAPSULE, DELAYED RELEASE ORAL
Qty: 90 CAPSULE | Refills: 1 | Status: SHIPPED | OUTPATIENT
Start: 2023-08-01

## 2023-08-01 NOTE — PROGRESS NOTES
[facility] - Outpatient Follow-up Note  Ailyn Kim 46 y.o. female MRN: [id number]  Encounter: [id number]          ASSESSMENT AND PLAN:    The patient presents today for follow up and re-evaluation of her IBS with diarrhea and constipation and follow up from her EGD in February. 1. Gastroesophageal reflux disease with esophagitis without hemorrhage  I discussed with the patient that at this point in time since her GERD symptoms are manageable, we will continue with the Nexium as prescribed for now and she can continue to use the famotidine for break through reflux as needed or when she is having an IBS flare up as well. The patient was agreeable and verbalized understanding.    - esomeprazole (NexIUM) 40 MG capsule; Take 1 capsule (40 mg total) by mouth daily before breakfast  Dispense: 90 capsule; Refill: 1  - famotidine (PEPCID) 40 MG tablet; Take 1 tablet (40 mg total) by mouth daily at bedtime as needed for heartburn  Dispense: 90 tablet; Refill: 0    2. Irritable bowel syndrome with both constipation and diarrhea  While the patient was in the office today, I discussed with the patient that stress can definitely affect irritable bowel syndrome as well as the constipation and at this point she can continue to use the dicyclomine as needed, but we will decrease the dose down to 10 mg 3 times daily as needed for abdominal pain and bloating during irritable bowel flareup. This way she would feel more comfortable using it as needed and hopefully will make her as tired. The patient was agreeable and verbalized an understanding.     We also thoroughly discussed since the patient has tried and failed MiraLAX and all the other over-the-counter medications to try to help with a regular bowel movement and I agree that if we can keep her having regular and consistent bowel movements at least every other day, if not daily, that she would feel better overall and could help with the irritable bowel syndrome, constipation and bloating. At this point we are going to start her on Linzess 72 mcg with the instructions to take 1 pill every other day for 10 days and then 1 pill daily as she wanted to go slow with it. The patient is to call our office or contact us if she has any side effects or issues. Encouraged the patient to continue to drink at least 64 ounces of water daily. - dicyclomine (BENTYL) 10 mg capsule; Take 1 PO TID PRN for abdominal pain/bloating. Dispense: 90 capsule; Refill: 3  - linaCLOtide (Linzess) 72 MCG CAPS; Take 1 PO QD for constipation. Dispense: 30 capsule; Refill: 2    3. Steatosis of Liver  Since her blood work is completely normal we will continue to just monitor the cytosis for now. Encouraged the patient to work on her diet and exercise regimen and that even losing 10% of her body weight could be helpful. Encouraged the patient to continue to follow-up with her primary care provider regarding management of her cholesterol and triglycerides. The patient will schedule a follow up office visit in 12 weeks, but understands to call or contact our office if there are any issues or concerns in the mean time. ______________________________________________________________________    SUBJECTIVE: Patient is a 46 y.o. female who was previously seen in our office on 1/25/23 by LAUREN Arita PA-C and presents today for follow-up office visit regarding her chronic IBS symptoms with both diarrhea and constipation with a recent flare up over the past few weeks that does seem to be slowly immproving. She reports that she can never identify any triggers or food that cause flare ups, except stress, which she reports she has been dealing with more between work an home. She also reports that she has continued with bloating, which the occasional use of the PRN Bentyl is helpful, but she doesn't like to take it too much because it makes her too sleepy.  She feels that if she had a better bowel regimen, she would feel better overall as sometimes she goes days without having a bowel movement and then has large volume BM's with a mixture of solid and loose/watery stools. Since her last OV she did have the EGD, which showed small polyps and evidence of mild esophagitis. She reports that the Nexium is providing significant relief of her reflux symptoms and maybe 1-2 x a month has break through reflux, which she uses apple cider vinegar for with immediate and complete relief. With the recent flare up she did follow up with her PCP who ordered the U/S of the abdomen which showed mild hepatic steatosis. She is working on getting her triglycerides and cholesterol down and better managed. The patient denies any nausea, dysphagia, vomiting, early satiety, decreased appetite, unplanned weight loss. Water Intake: working on 64 oz daily, especially since the most recent flare up. The patient denies any consistent diarrhea, nocturnal BMs, melena or bloody stools. [place]: 6. Last BM: Last Friday. Meds: Nexium 40 mg QD, Bentyl 20 mg QID PRN, Famotidine 40 mg PRN (but hasn't been using)    Imaging: (2/27/23): U/S Abd Complete: Mild Hepatic Steatosis     Endoscopy History: EGD: (2/8/23): Ten or more sessile fundic gland polyps measuring less than 5 mm. Path: Mild esophagitis     COLONOSCOPY: (7/2/19): Normal. Random biopsies obtained, normal.    DUE: 7/2/29    REVIEW OF SYSTEMS IS OTHERWISE NEGATIVE.       Historical Information   Past Medical History:   Diagnosis Date   • Bladder cancer ([place])     at age of 31   • Colon polyp     stomach polyps   • Disease of thyroid gland    • GERD (gastroesophageal reflux disease)    • Hypertension    • MVP (mitral valve prolapse)      Past Surgical History:   Procedure Laterality Date   • BACK SURGERY     • CHOLECYSTECTOMY     • HYSTERECTOMY     • TRANSURETHRAL RESECTION OF BLADDER       Social History   Social History     Substance and Sexual Activity Alcohol Use Yes    Comment: occasional     Social History     Substance and Sexual Activity   Drug Use Never     Social History     Tobacco Use   Smoking Status Never   Smokeless Tobacco Never     Family History   Problem Relation Age of Onset   • Diabetes Mother    • Cancer Mother    • Diabetes Father    • Hypertension Father    • Hyperlipidemia Father    • No Known Problems Sister    • No Known Problems Maternal Grandmother    • No Known Problems Paternal Grandmother    • No Known Problems Sister    • No Known Problems Maternal Aunt    • Lymphoma Maternal Aunt    • No Known Problems Paternal Aunt    • No Known Problems Paternal Aunt    • Breast cancer Neg Hx        Meds/Allergies       Current Outpatient Medications:   •  amoxicillin (AMOXIL) 500 MG tablet  •  atorvastatin (LIPITOR) 10 mg tablet  •  Calcium-Magnesium-Zinc 167-83-8 MG TABS  •  Cholecalciferol (VITAMIN D3) 125 MCG (5000 UT) CAPS  •  dicyclomine (BENTYL) 20 mg tablet  •  esomeprazole (NexIUM) 40 MG capsule  •  estradiol (VAGIFEM, YUVAFEM) 10 MCG TABS vaginal tablet  •  Evening Primrose Oil 1000 MG CAPS  •  famotidine (PEPCID) 40 MG tablet  •  FLUoxetine (PROzac) 20 mg capsule  •  Icosapent Ethyl 1 g CAPS  •  levothyroxine 25 mcg tablet  •  LEVOTHYROXINE SODIUM PO  •  metoprolol succinate (TOPROL-XL) 25 mg 24 hr tablet  •  mometasone (ELOCON) 0.1 % lotion  •  sucralfate (CARAFATE) 1 g/10 mL suspension    No Known Allergies        Objective     not currently breastfeeding. There is no height or weight on file to calculate BMI. PHYSICAL EXAM:      General Appearance:   Alert, cooperative, no distress   HEENT:   Normocephalic, atraumatic, anicteric. Neck:  Supple, symmetrical, trachea midline   Lungs:   Clear to auscultation bilaterally; no rales, rhonchi or wheezing; respirations unlabored    Heart[de-identified]   Regular rate and rhythm; no murmur, rub, or gallop.    Abdomen:   Soft, non-tender, non-distended; normal bowel sounds; no masses, no organomegaly    Genitalia:   Deferred    Rectal:   Deferred    Extremities:  No cyanosis, clubbing or edema    Pulses:  2+ and symmetric    Skin:  No jaundice, rashes, or lesions    Lymph nodes:  No palpable cervical lymphadenopathy        Lab Results:   No visits with results within 1 Day(s) from this visit. Latest known visit with results is:   Appointment on 02/27/2023   Component Date Value   • WBC 02/27/2023 6.15    • RBC 02/27/2023 4.41    • Hemoglobin 02/27/2023 13.9    • Hematocrit 02/27/2023 42.4    • MCV 02/27/2023 96    • MCH 02/27/2023 31.5    • MCHC 02/27/2023 32.8    • RDW 02/27/2023 12.7    • MPV 02/27/2023 10.1    • Platelets 53/06/9792 196    • nRBC 02/27/2023 0    • Neutrophils Relative 02/27/2023 52    • Immat GRANS % 02/27/2023 1    • Lymphocytes Relative 02/27/2023 35    • Monocytes Relative 02/27/2023 8    • Eosinophils Relative 02/27/2023 3    • Basophils Relative 02/27/2023 1    • Neutrophils Absolute 02/27/2023 3.22    • Immature Grans Absolute 02/27/2023 0.07    • Lymphocytes Absolute 02/27/2023 2.14    • Monocytes Absolute 02/27/2023 0.47    • Eosinophils Absolute 02/27/2023 0.19    • Basophils Absolute 02/27/2023 0.06    • Sodium 02/27/2023 136    • Potassium 02/27/2023 3.8    • Chloride 02/27/2023 100    • CO2 02/27/2023 29    • ANION GAP 02/27/2023 7    • BUN 02/27/2023 11    • Creatinine 02/27/2023 0.77    • Glucose, Fasting 02/27/2023 96    • Calcium 02/27/2023 9.2    • AST 02/27/2023 24    • ALT 02/27/2023 43    • Alkaline Phosphatase 02/27/2023 60    • Total Protein 02/27/2023 6.9    • Albumin 02/27/2023 4.2    • Total Bilirubin 02/27/2023 0.55    • eGFR 02/27/2023 88    • TSH 3RD GENERATON 02/27/2023 2.494          Radiology Results:   No results found.   Answers for HPI/ROS submitted by the patient on 7/31/2023  Chronicity: recurrent  Onset: in the past 7 days  Onset quality: undetermined  Frequency: constantly  Progression since onset: gradually improving  Pain location: LLQ, LUQ, RLQ, RUQ, epigastric region, generalized abdominal region, periumbilical region, suprapubic region, left flank, right flank  Pain - numeric: 7/10  Pain quality: aching, dull, a sensation of fullness  anorexia: No  arthralgias: Yes  belching: No  diarrhea: No  dysuria: No  fever: No  flatus: No  frequency: No  headaches: Yes  hematochezia: No  hematuria: No  melena: No  myalgias: Yes  nausea:  No  weight loss: No  vomiting: No  Aggravated by: certain positions, movement  Relieved by: recumbency

## 2023-08-01 NOTE — PATIENT INSTRUCTIONS
Continue Nexium  Decrease Bentyl to 10 mg, every 8 hours as needed for IBS flare up. Continue famotidine 40 mg daily as needed for IBS flare up. Start Linzess 72 mcg, 1 pill every other day for 10 days, then daily. Continue to work on increasing water intake to 64 oz daily. Continue to work on diet and weight loss goals of 10-20 lbs. Continue to work on stress relief. Schedule a follow up office visit in 12 weeks.

## 2023-08-16 ENCOUNTER — APPOINTMENT (OUTPATIENT)
Dept: LAB | Facility: CLINIC | Age: 53
End: 2023-08-16

## 2023-08-16 DIAGNOSIS — Z00.8 HEALTH EXAMINATION IN POPULATION SURVEYS: ICD-10-CM

## 2023-08-16 LAB
CHOLEST SERPL-MCNC: 274 MG/DL
EST. AVERAGE GLUCOSE BLD GHB EST-MCNC: 117 MG/DL
HBA1C MFR BLD: 5.7 %
HDLC SERPL-MCNC: 34 MG/DL
NONHDLC SERPL-MCNC: 240 MG/DL
TRIGL SERPL-MCNC: 561 MG/DL

## 2023-08-16 PROCEDURE — 80061 LIPID PANEL: CPT

## 2023-08-16 PROCEDURE — 36415 COLL VENOUS BLD VENIPUNCTURE: CPT

## 2023-08-16 PROCEDURE — 83036 HEMOGLOBIN GLYCOSYLATED A1C: CPT

## 2023-08-22 ENCOUNTER — NURSE TRIAGE (OUTPATIENT)
Age: 53
End: 2023-08-22

## 2023-08-22 NOTE — TELEPHONE ENCOUNTER
I called the patient , she did not start the [address] but I talked to Denise Pride  in the office and he wants her to start taking the [address] and call us back in 2-3 days to let us know how it is working for her, he also stated that she can take the gas x for the bloating. She expressed understanding.

## 2023-08-22 NOTE — TELEPHONE ENCOUNTER
Advise her to try some Gas X as needed and see how that goes. Is she taking the Linzess? Have her try that for the next 2-3 days and then contact us with an update and we will go from there.

## 2023-08-22 NOTE — TELEPHONE ENCOUNTER
Last ov 8/1/23  Procedures EGD 2/8/23 Colon 7/2/19  Labs 8/16/23 Imaging 2/27/83 ultrasound    Answer Assessment - Initial Assessment Questions  1. LOCATION: "Where does it hurt?"       Umbilicus and below  2. RADIATION: "Does the pain shoot anywhere else?" (e.g., chest, back)    Patient states all across lower abdomen  3. ONSET: "When did the pain begin?" (e.g., minutes, hours or days ago)       Saturday 8/19/ 4. SUDDEN: "Gradual or sudden onset?"      sudden  5. PATTERN "Does the pain come and go, or is it constant?"     - If constant: "Is it getting better, staying the same, or worsening?"       (Note: Constant means the pain never goes away completely; most serious pain is constant and it progresses)      - If intermittent: "How long does it last?" "Do you have pain now?"      (Note: Intermittent means the pain goes away completely between bouts)      intermittent  6. SEVERITY: "How bad is the pain?"  (e.g., Scale 1-10; mild, moderate, or severe)    - MILD (1-3): doesn't interfere with normal activities, abdomen soft and not tender to touch     - MODERATE (4-7): interferes with normal activities or awakens from sleep, tender to touch     - SEVERE (8-10): excruciating pain, doubled over, unable to do any normal activities       Moderate level 4 when it hits  7. RECURRENT SYMPTOM: "Have you ever had this type of stomach pain before?" If Yes, ask: "When was the last time?" and "What happened that time?"       yes  8. CAUSE: "What do you think is causing the stomach pain?"      unknown  9. RELIEVING/AGGRAVATING FACTORS: "What makes it better or worse?" (e.g., movement, antacids, bowel movement)      Notices more with movement  10. OTHER SYMPTOMS: "Has there been any vomiting, diarrhea, constipation, or urine problems?"       no  11.  PREGNANCY: "Is there any chance you are pregnant?" "When was your last menstrual period?"   no    Protocols used: ABDOMINAL PAIN - [facility] - ANTONIETA FERR  Patient sent message earlier and also calling in. Patient experiencing lower abdominal cramping, tenderness below umbilicus. It is intermittent. Had loose bowel movement yesterday, none today and prior to that formed stool. She is on bland diet and feels bloated after eating. 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 She has taken the Bentyl but it makes her sleepy and leaves lingering taste in her mouth. Please review and advise. If any orders patient uses [employer] on file.

## 2023-08-24 NOTE — TELEPHONE ENCOUNTER
Spoke with patient and advised to stop the linzess and continue on the gas-x. Patient will call in next week with an update.

## 2023-08-24 NOTE — TELEPHONE ENCOUNTER
Pt called to update us on how she feels with linzess. Pt started linzess two days ago and developed diarrhea yesterday. She explained she had multiple watery stools yesterday and developed a pounding headache. pts abdominal pain is still present and the same as before but her bloating has subsided. She did not take linzess today. She only had one loose bm and still has headache today. Pt stated she is unable to take linzess again.      I explained I would reach out to [facility] for further advice

## 2023-08-24 NOTE — TELEPHONE ENCOUNTER
I would continue the Gas X for now, stop the Linzess, and make sure she is drinking 64 oz of water daily for the next few days and see how she does. We will hold off any other new medications for now and she should contact us next Monday to give us an update.

## 2023-11-06 ENCOUNTER — OFFICE VISIT (OUTPATIENT)
Dept: GASTROENTEROLOGY | Facility: CLINIC | Age: 53
End: 2023-11-06
Payer: COMMERCIAL

## 2023-11-06 VITALS
WEIGHT: 171 LBS | HEART RATE: 76 BPM | BODY MASS INDEX: 25.91 KG/M2 | HEIGHT: 68 IN | TEMPERATURE: 98.2 F | DIASTOLIC BLOOD PRESSURE: 84 MMHG | SYSTOLIC BLOOD PRESSURE: 126 MMHG

## 2023-11-06 DIAGNOSIS — Z12.11 SCREENING FOR COLON CANCER: ICD-10-CM

## 2023-11-06 DIAGNOSIS — K76.0 STEATOSIS OF LIVER: ICD-10-CM

## 2023-11-06 DIAGNOSIS — K21.00 GASTROESOPHAGEAL REFLUX DISEASE WITH ESOPHAGITIS WITHOUT HEMORRHAGE: ICD-10-CM

## 2023-11-06 DIAGNOSIS — K58.2 IRRITABLE BOWEL SYNDROME WITH BOTH CONSTIPATION AND DIARRHEA: Primary | ICD-10-CM

## 2023-11-06 PROCEDURE — 99214 OFFICE O/P EST MOD 30 MIN: CPT | Performed by: NURSE PRACTITIONER

## 2023-11-06 RX ORDER — FAMOTIDINE 40 MG/1
40 TABLET, FILM COATED ORAL
Qty: 90 TABLET | Refills: 1 | Status: CANCELLED | OUTPATIENT
Start: 2023-11-06

## 2023-11-06 RX ORDER — ESOMEPRAZOLE MAGNESIUM 40 MG/1
40 CAPSULE, DELAYED RELEASE ORAL
Qty: 90 CAPSULE | Refills: 1 | Status: CANCELLED | OUTPATIENT
Start: 2023-11-06

## 2023-11-06 RX ORDER — DICYCLOMINE HYDROCHLORIDE 10 MG/1
CAPSULE ORAL
Qty: 90 CAPSULE | Refills: 3 | Status: CANCELLED | OUTPATIENT
Start: 2023-11-06

## 2023-11-06 NOTE — PATIENT INSTRUCTIONS
Try decreasing the Nexium to 1 pill every other day x 2 weeks, then 1 pill every 3-4 days for 2 weeks, then stop. Contact me if you have any issues and to update on how you are doing in 1 month. Think about Amitiza for constipation. Continue to try to drink at least 64 oz of water daily. Talk with PCP about low dose Cymbalta. Schedule a f/u OV in 6 months. Continue watch for red flag symptoms. While the patient was in the office today we did review GI red flag symptoms, including, but not limited to: chronic nausea, vomiting, diarrhea, chills, fever, and unintentional weight loss and should call or contact our office with any changes or concerns. I reviewed with the patient that if they notice any blood while vomiting or in their stool they should contact or office or go to the nearest emergency room for immediate evaluation. The patient was agreeable and verbalized an understanding.

## 2023-11-06 NOTE — PROGRESS NOTES
[facility] - Outpatient Follow-up Note  Giovanny Fuchs 46 y.o. female MRN: [id number]  Encounter: [id number]          ASSESSMENT AND PLAN:    The patient presents today for follow-up office visit regarding her irritable bowel syndrome with constipation and diarrhea, GERD, and steatosis of the liver. Exam: Abdomen is soft, nondistended, with mild diffuse tenderness in the lower quadrants, left side greater than right without any guarding or rebound tenderness, with hypoactive bowel sounds x4. No edema noted of the b/l lower extremities upon exam today. Skin is non-icteric. 1. Irritable bowel syndrome with both constipation and diarrhea  While the patient was in the office today, I discussed with the patient that since the [address] deftly seem to help with her constipation but cause worsening diarrhea, another option would be to try different medication such as Amitiza 8 mcg twice daily to try to help with the constipation. However, for now, the patient would like to hold off on the Amitiza and think about it and if she decides she like to proceed with the medication, she will call our office and we will proceed from there. Continue to try to drink 64 of water daily. 2. Gastroesophageal reflux disease with esophagitis without hemorrhage  Stable    At this point in time since the patient would like to see if she could come off of the Nexium I advised her that she can start taking the Nexium every other day for 2 weeks and then once every 3 to 4 days for 2 weeks and then stop the Nexium and can just continue the famotidine as needed for now. However, I discussed with the patient that if she has any issues and feels she needs to stay on the Nexium, she should contact our office and we can send a new prescription at that time. The patient was agreeable and verbalized an understanding. Encouraged the patient to continue to try to avoid trigger foods.     3. Steatosis of liver  Stable    Discussed recommendations in regards to fatty liver including:   Strict control of contributing comorbidities (obesity, prediabetes/diabetes, hypertension, and hypertriglyceridemia). Weight loss of approx 10-15% of patient's current body weight over a period of 6-12 months through low fat diet and cardiovascular exercise as tolerated. Limiting alcohol consumption, preferably complete abstinence. Monitor hepatic function every 6 months with routine labs. We will also proceed with updated liver imaging at her next office visit. - CBC and differential; Future  - Bilirubin, direct; Future  - Comprehensive metabolic panel; Future    4. Screening for colon cancer  I discussed with the patient today that since they are not currently experiencing any red flag symptoms, we will hold off on any repeat colonoscopies until the recommended surveillance date unless the patient would start to develop red flag symptoms in the future. The patient was agreeable and verbalized an understanding. DUE: 7/2/29    While the patient was in the office today we did review GI red flag symptoms, including, but not limited to: chronic nausea, vomiting, diarrhea, chills, fever, and unintentional weight loss and should call or contact our office with any changes or concerns. I reviewed with the patient that if they notice any blood while vomiting or in their stool they should contact or office or go to the nearest emergency room for immediate evaluation. The patient was agreeable and verbalized an understanding. The patient will schedule a follow up office visit in 6 months, but understands to call or contact our office if there are any issues or concerns in the mean time.   ______________________________________________________________________    SUBJECTIVE: Patient is a 46 y.o. female who was previously seen in our office on 8/1/23 and presents today for follow-up office visit regarding her irritable bowel syndrome with constipation and diarrhea, GERD, and steatosis of the liver. The patient reports that since her last office visit with regards to her GERD symptoms she actually feels there has been some improvement because she was having issues getting her Nexium and noticed that she went a few days without it and did not notice any significant difference and wanted to discuss the possibility of seeing if she could come off of it and just use the famotidine as needed in its place as it is her wishes to be on the least amount of medications possible. The patient also reports that she continues to use the Bentyl on a very sparing as needed basis as even at the lower dose of 10 mg it does make her tired and she tries to only use it at nighttime or on the weekends when she does not have to be be at work or functional.    She also reports that since her last office visit she did try the previously prescribed Linzess and unfortunately caused her to have significant and painful diarrhea so she has stopped taking the [address] and has found that as long as she is more diligent about drinking the 64 ounces of water a day we recommended she has seen much more predictable bowel movements with less discomfort. Although, in general, the patient continues to report mild to generalized abdominal discomfort, especially in the lower quadrants. The patient denies any reflux, nausea, dysphagia, vomiting, decreased appetite, unplanned weight loss, or abdominal pain. Water Intake: approximately 64 oz per day. Meds: Nexium 40 mg daily, famotidine 40 mg daily as needed and Bentyl 10 mg 3 times daily as needed for abdominal pain and spasms. Imaging: (None):     Endoscopy History: EGD: (2/8/23): Ten or more sessile fundic gland polyps measuring less than 5 mm.  Path: Mild esophagitis     COLONOSCOPY: (7/2/19): Normal. Random biopsies obtained, normal.     DUE: 7/2/29    REVIEW OF SYSTEMS IS OTHERWISE NEGATIVE. Historical Information   Past Medical History:   Diagnosis Date    Bladder cancer ([address])     at age of 31    Colon polyp     stomach polyps    Disease of thyroid gland     GERD (gastroesophageal reflux disease)     Hypertension     MVP (mitral valve prolapse)      Past Surgical History:   Procedure Laterality Date    BACK SURGERY      CHOLECYSTECTOMY      HYSTERECTOMY      TRANSURETHRAL RESECTION OF BLADDER       Social History   Social History     Substance and Sexual Activity   Alcohol Use Yes    Comment: occasional     Social History     Substance and Sexual Activity   Drug Use Never     Social History     Tobacco Use   Smoking Status Never   Smokeless Tobacco Never     Family History   Problem Relation Age of Onset    Diabetes Mother     Cancer Mother     Diabetes Father     Hypertension Father     Hyperlipidemia Father     No Known Problems Sister     No Known Problems Maternal Grandmother     No Known Problems Paternal Grandmother     No Known Problems Sister     No Known Problems Maternal Aunt     Lymphoma Maternal Aunt     No Known Problems Paternal Aunt     No Known Problems Paternal Aunt     Breast cancer Neg Hx        Meds/Allergies       Current Outpatient Medications:     atorvastatin (LIPITOR) 10 mg tablet    Calcium-Magnesium-Zinc 167-83-8 MG TABS    Cholecalciferol (VITAMIN D3) 125 MCG (5000 UT) CAPS    dicyclomine (BENTYL) 10 mg capsule    esomeprazole (NexIUM) 40 MG capsule    estradiol (VAGIFEM, YUVAFEM) 10 MCG TABS vaginal tablet    famotidine (PEPCID) 40 MG tablet    FLUoxetine (PROzac) 20 mg capsule    Icosapent Ethyl 1 g CAPS    levothyroxine 25 mcg tablet    linaCLOtide (Linzess) 72 MCG CAPS    metoprolol succinate (TOPROL-XL) 25 mg 24 hr tablet    No Known Allergies        Objective     Blood pressure 126/84, pulse 76, temperature 98.2 °F (36.8 °C), temperature source Tympanic, height 5' 8" (1.727 m), weight 77.6 kg (171 lb), not currently breastfeeding.  Body mass index is 26 kg/m².      PHYSICAL EXAM:      General Appearance:   Alert, cooperative, no distress   HEENT:   Normocephalic, atraumatic, anicteric. Neck:  Supple, symmetrical, trachea midline   Lungs:   Clear to auscultation bilaterally; no rales, rhonchi or wheezing; respirations unlabored    Heart[de-identified]   Regular rate and rhythm; no murmur, rub, or gallop. Abdomen:   Soft, non-tender, non-distended; normal bowel sounds; no masses, no organomegaly    Genitalia:   Deferred    Rectal:   Deferred    Extremities:  No cyanosis, clubbing or edema    Pulses:  2+ and symmetric    Skin:  No jaundice, rashes, or lesions    Lymph nodes:  No palpable cervical lymphadenopathy        Lab Results:   No visits with results within 1 Day(s) from this visit. Latest known visit with results is:   Appointment on 08/16/2023   Component Date Value    Hemoglobin A1C 08/16/2023 5.7 (H)     EAG 08/16/2023 117     Cholesterol 08/16/2023 274 (H)     Triglycerides 08/16/2023 561 (H)     HDL, Direct 08/16/2023 34 (L)     LDL Calculated 08/16/2023      Non-HDL-Chol (CHOL-HDL) 08/16/2023 240          Radiology Results:   No results found. Answers submitted by the patient for this visit:  Abdominal Pain Questionnaire (Submitted on 11/5/2023)  Chief Complaint: Abdominal pain  Chronicity: recurrent  Onset: more than 1 year ago  Onset quality: undetermined  Frequency: intermittently  Episode duration: 5 Days  Progression since onset: gradually improving  Pain location: generalized abdominal region  Pain - numeric: 2/10  Pain quality: aching, cramping, dull, a sensation of fullness  arthralgias: Yes  belching: No  constipation: Yes  diarrhea: Yes  dysuria: No  flatus: No  frequency: No  headaches: Yes  hematochezia: No  hematuria: No  melena: No  myalgias: Yes  nausea:  No  weight loss: No  vomiting: No  Aggravated by: certain positions, movement

## 2023-12-09 ENCOUNTER — APPOINTMENT (OUTPATIENT)
Dept: LAB | Facility: CLINIC | Age: 53
End: 2023-12-09
Payer: COMMERCIAL

## 2023-12-09 DIAGNOSIS — E03.8 HYPOTHYROIDISM DUE TO RIEDEL'S THYROIDITIS: ICD-10-CM

## 2023-12-09 DIAGNOSIS — E06.5 HYPOTHYROIDISM DUE TO RIEDEL'S THYROIDITIS: ICD-10-CM

## 2023-12-09 DIAGNOSIS — R23.2 HOT FLASHES: ICD-10-CM

## 2023-12-09 DIAGNOSIS — K76.0 STEATOSIS OF LIVER: ICD-10-CM

## 2023-12-09 LAB
ALBUMIN SERPL BCP-MCNC: 4.8 G/DL (ref 3.5–5)
ALP SERPL-CCNC: 54 U/L (ref 34–104)
ALT SERPL W P-5'-P-CCNC: 22 U/L (ref 7–52)
ANION GAP SERPL CALCULATED.3IONS-SCNC: 6 MMOL/L
AST SERPL W P-5'-P-CCNC: 19 U/L (ref 13–39)
BILIRUB DIRECT SERPL-MCNC: 0.09 MG/DL (ref 0–0.2)
BILIRUB SERPL-MCNC: 0.61 MG/DL (ref 0.2–1)
BUN SERPL-MCNC: 15 MG/DL (ref 5–25)
CALCIUM SERPL-MCNC: 9.9 MG/DL (ref 8.4–10.2)
CHLORIDE SERPL-SCNC: 103 MMOL/L (ref 96–108)
CO2 SERPL-SCNC: 30 MMOL/L (ref 21–32)
CREAT SERPL-MCNC: 0.72 MG/DL (ref 0.6–1.3)
ESTRADIOL SERPL-MCNC: 31.6 PG/ML
GFR SERPL CREATININE-BSD FRML MDRD: 95 ML/MIN/1.73SQ M
GLUCOSE P FAST SERPL-MCNC: 113 MG/DL (ref 65–99)
POTASSIUM SERPL-SCNC: 4.1 MMOL/L (ref 3.5–5.3)
PROT SERPL-MCNC: 7.7 G/DL (ref 6.4–8.4)
SODIUM SERPL-SCNC: 139 MMOL/L (ref 135–147)
TSH SERPL DL<=0.05 MIU/L-ACNC: 1.62 UIU/ML (ref 0.45–4.5)

## 2023-12-09 PROCEDURE — 82670 ASSAY OF TOTAL ESTRADIOL: CPT

## 2023-12-09 PROCEDURE — 82248 BILIRUBIN DIRECT: CPT

## 2023-12-09 PROCEDURE — 84443 ASSAY THYROID STIM HORMONE: CPT

## 2023-12-09 PROCEDURE — 80053 COMPREHEN METABOLIC PANEL: CPT

## 2023-12-19 DIAGNOSIS — K21.00 GASTROESOPHAGEAL REFLUX DISEASE WITH ESOPHAGITIS WITHOUT HEMORRHAGE: ICD-10-CM

## 2023-12-20 RX ORDER — FAMOTIDINE 40 MG/1
TABLET, FILM COATED ORAL
Qty: 90 TABLET | Refills: 0 | Status: SHIPPED | OUTPATIENT
Start: 2023-12-20

## 2024-01-05 PROBLEM — Z12.11 SCREENING FOR COLON CANCER: Status: RESOLVED | Noted: 2023-11-06 | Resolved: 2024-01-05

## 2024-01-27 ENCOUNTER — APPOINTMENT (OUTPATIENT)
Dept: RADIOLOGY | Facility: CLINIC | Age: 54
End: 2024-01-27
Payer: COMMERCIAL

## 2024-01-27 PROCEDURE — 72052 X-RAY EXAM NECK SPINE 6/>VWS: CPT

## 2024-02-05 ENCOUNTER — HOSPITAL ENCOUNTER (OUTPATIENT)
Dept: MAMMOGRAPHY | Facility: HOSPITAL | Age: 54
Discharge: HOME | End: 2024-02-05
Payer: COMMERCIAL

## 2024-02-05 ENCOUNTER — APPOINTMENT (OUTPATIENT)
Dept: LAB | Facility: HOSPITAL | Age: 54
End: 2024-02-05
Payer: COMMERCIAL

## 2024-02-05 DIAGNOSIS — Z12.31 ENCOUNTER FOR SCREENING MAMMOGRAM FOR MALIGNANT NEOPLASM OF BREAST: ICD-10-CM

## 2024-02-05 DIAGNOSIS — E78.5 HYPERLIPIDEMIA, UNSPECIFIED HYPERLIPIDEMIA TYPE: ICD-10-CM

## 2024-02-05 LAB — TRIGL SERPL-MCNC: 133 MG/DL

## 2024-02-05 PROCEDURE — 84478 ASSAY OF TRIGLYCERIDES: CPT

## 2024-02-05 PROCEDURE — 36415 COLL VENOUS BLD VENIPUNCTURE: CPT

## 2024-02-05 PROCEDURE — 77063 BREAST TOMOSYNTHESIS BI: CPT

## 2024-02-05 PROCEDURE — 77067 SCR MAMMO BI INCL CAD: CPT

## 2024-02-08 ENCOUNTER — OFFICE VISIT (OUTPATIENT)
Dept: CARDIOLOGY CLINIC | Facility: HOSPITAL | Age: 54
End: 2024-02-08
Payer: COMMERCIAL

## 2024-02-08 VITALS
BODY MASS INDEX: 24.1 KG/M2 | HEART RATE: 86 BPM | HEIGHT: 68 IN | DIASTOLIC BLOOD PRESSURE: 70 MMHG | WEIGHT: 159 LBS | SYSTOLIC BLOOD PRESSURE: 108 MMHG

## 2024-02-08 DIAGNOSIS — E78.2 MIXED HYPERLIPIDEMIA: ICD-10-CM

## 2024-02-08 DIAGNOSIS — I34.1 MVP (MITRAL VALVE PROLAPSE): ICD-10-CM

## 2024-02-08 DIAGNOSIS — R00.2 HEART PALPITATIONS: Primary | ICD-10-CM

## 2024-02-08 DIAGNOSIS — R07.89 OTHER CHEST PAIN: ICD-10-CM

## 2024-02-08 PROCEDURE — 93000 ELECTROCARDIOGRAM COMPLETE: CPT | Performed by: INTERNAL MEDICINE

## 2024-02-08 PROCEDURE — 99244 OFF/OP CNSLTJ NEW/EST MOD 40: CPT | Performed by: INTERNAL MEDICINE

## 2024-02-08 RX ORDER — DULOXETIN HYDROCHLORIDE 20 MG/1
20 CAPSULE, DELAYED RELEASE ORAL DAILY
COMMUNITY

## 2024-02-08 RX ORDER — AMOXICILLIN 250 MG/1
CAPSULE ORAL EVERY 8 HOURS SCHEDULED
COMMUNITY

## 2024-02-08 RX ORDER — TIZANIDINE HYDROCHLORIDE 2 MG/1
2 CAPSULE, GELATIN COATED ORAL 3 TIMES DAILY
COMMUNITY

## 2024-02-08 NOTE — PROGRESS NOTES
Nathalie Chengcaesar  1970  [id number]  [facility] [address] [id number] [phone]    1. Heart palpitations  POCT ECG    CT coronary calcium score    Echo complete w/ contrast if indicated      2. MVP (mitral valve prolapse)  POCT ECG    CT coronary calcium score    Echo complete w/ contrast if indicated      3. Mixed hyperlipidemia  POCT ECG    CT coronary calcium score    Echo complete w/ contrast if indicated      4. Other chest pain  POCT ECG    CT coronary calcium score    Echo complete w/ contrast if indicated          Discussion/Summary: Today is my first visit with the patient.  She has a history of possible mitral valve prolapse I seen multiple echoes with different readings.  I am going to get my own echocardiogram to give a impression.  No murmur on exam.  Blood pressure well-controlled.  Triglycerides significantly dropped with dietary change.  Agree with continuing Lipitor.  She has questions about coronary risk we have ordered a coronary calcium score to help quantify this.  I will see her back in 6 months.  Continue with diet and exercise.    Interval History: 53-year-old female with a history of mitral valve prolapse, borderline hypertension, hyperlipidemia presents for new patient consult on behalf of Dr. Weiner.  Functional capacity has been good.  She gets occasional palpitations these are short-lived and occur once or twice a month.  She denies any significant change in her functional capacity.  She has small aches and pains sharp pains in her chest but has a history of fibromyalgia.  These do not sound anginal in nature.  Denies any lower extremity edema, PND, orthopnea.  She is a non-smoker.  No significant alcohol intake.  Moderate hydration.  Mild caffeine use.    Medical Problems       Problem List       Gastroesophageal reflux disease with esophagitis without hemorrhage    Irritable bowel syndrome with both  constipation and diarrhea    Steatosis of liver    Mixed hyperlipidemia    MVP (mitral valve prolapse)    Heart palpitations    Other chest pain        Past Medical History:   Diagnosis Date    Bladder cancer (HCC)     at age of 30    Colon polyp     stomach polyps    Disease of thyroid gland     GERD (gastroesophageal reflux disease)     Hypertension     MVP (mitral valve prolapse)      Social History     Socioeconomic History    Marital status: /Civil Union     Spouse name: Not on file    Number of children: Not on file    Years of education: Not on file    Highest education level: Not on file   Occupational History    Not on file   Tobacco Use    Smoking status: Never    Smokeless tobacco: Never   Vaping Use    Vaping status: Never Used   Substance and Sexual Activity    Alcohol use: Yes     Comment: occasional    Drug use: Never    Sexual activity: Not on file   Other Topics Concern    Not on file   Social History Narrative    Not on file     Social Determinants of Health     Financial Resource Strain: Not on file   Food Insecurity: Not on file   Transportation Needs: Not on file   Physical Activity: Not on file   Stress: Not on file   Social Connections: Not on file   Intimate Partner Violence: Not on file   Housing Stability: Not on file      Family History   Problem Relation Age of Onset    Diabetes Mother     Cancer Mother     Diabetes Father     Hypertension Father     Hyperlipidemia Father     No Known Problems Sister     No Known Problems Maternal Grandmother     No Known Problems Paternal Grandmother     No Known Problems Sister     No Known Problems Maternal Aunt     Lymphoma Maternal Aunt     No Known Problems Paternal Aunt     No Known Problems Paternal Aunt     Breast cancer Neg Hx      Past Surgical History:   Procedure Laterality Date    BACK SURGERY      CHOLECYSTECTOMY      HYSTERECTOMY      TRANSURETHRAL RESECTION OF BLADDER         Current Outpatient Medications:     amoxicillin (AMOXIL)  "250 mg capsule, Take by mouth every 8 (eight) hours For the dentist, Disp: , Rfl:     atorvastatin (LIPITOR) 10 mg tablet, , Disp: , Rfl:     Cholecalciferol (VITAMIN D3) 125 MCG (5000 UT) CAPS, , Disp: , Rfl:     dicyclomine (BENTYL) 10 mg capsule, Take 1 PO TID PRN for abdominal pain/bloating., Disp: 90 capsule, Rfl: 3    DULoxetine (CYMBALTA) 20 mg capsule, Take 20 mg by mouth daily, Disp: , Rfl:     estradiol (VAGIFEM, YUVAFEM) 10 MCG TABS vaginal tablet, Insert into the vagina once a week , Disp: , Rfl:     famotidine (PEPCID) 40 MG tablet, TAKE ONE TABLET BY MOUTH EVERY DAY AT BEDTIME AS NEEDED FOR HEARTBURN, Disp: 90 tablet, Rfl: 0    levothyroxine 25 mcg tablet, , Disp: , Rfl:     metoprolol succinate (TOPROL-XL) 25 mg 24 hr tablet, , Disp: , Rfl:     TiZANidine (Zanaflex) 2 MG capsule, Take 2 mg by mouth 3 (three) times a day, Disp: , Rfl:     Calcium-Magnesium-Zinc 167-83-8 MG TABS, , Disp: , Rfl:     esomeprazole (NexIUM) 40 MG capsule, Take 1 capsule (40 mg total) by mouth daily before breakfast, Disp: 90 capsule, Rfl: 1    FLUoxetine (PROzac) 20 mg capsule, , Disp: , Rfl:   No Known Allergies    Labs:     Chemistry        Component Value Date/Time    K 4.1 12/09/2023 1048    K 4.4 04/13/2018 0850     12/09/2023 1048     04/13/2018 0850    CO2 30 12/09/2023 1048    CO2 27 04/13/2018 0850    BUN 15 12/09/2023 1048    BUN 12 04/13/2018 0850    CREATININE 0.72 12/09/2023 1048    CREATININE 0.8 04/13/2018 0850        Component Value Date/Time    CALCIUM 9.9 12/09/2023 1048    CALCIUM 9.8 04/13/2018 0850    ALKPHOS 54 12/09/2023 1048    ALKPHOS 35 04/13/2018 0850    AST 19 12/09/2023 1048    AST 16 04/13/2018 0850    ALT 22 12/09/2023 1048    ALT 18 04/13/2018 0850            No results found for: \"CHOL\"  Lab Results   Component Value Date    HDL 34 (L) 08/16/2023    HDL 42 (L) 08/02/2022    HDL 43 09/04/2021     Lab Results   Component Value Date    LDLCALC  08/16/2023      Comment:      " "Calculated LDL invalid, triglycerides >400 mg/dl  This screening LDL is a calculated result.   It does not have the accuracy of the Direct Measured LDL in the monitoring of patients with hyperlipidemia and/or statin therapy.   Direct Measure LDL (IAC156) must be ordered separately in these patients.    LDLCALC 127 (H) 08/02/2022    LDLCALC 128 (H) 09/04/2021     Lab Results   Component Value Date    TRIG 133 02/05/2024    TRIG 561 (H) 08/16/2023    TRIG 380 (H) 08/02/2022     No results found for: \"CHOLHDL\"    Imaging: Mammo screening bilateral w 3d & cad    Result Date: 2/7/2024  Narrative: DIAGNOSIS: Encounter for screening mammogram for malignant neoplasm of breast TECHNIQUE: Digital screening mammography was performed. Computer Aided Detection (CAD) analyzed all applicable images. COMPARISONS: Prior breast imaging dated: 12/12/2022, 12/12/2022, 12/06/2022, 07/29/2021, 07/29/2021, 07/12/2021, 07/12/2021, 07/12/2021, 10/21/2019, 03/23/2018, 03/23/2018, 03/16/2018, 03/16/2018, 11/30/2016, 11/30/2016, 12/02/2015, 12/02/2015, 12/24/2014, and 12/18/2014 RELEVANT HISTORY: Family Breast Cancer History: History of breast cancer in Neg Hx. Family Medical History: No known relevant family medical history. Personal History: Hormone history includes estrogen replacement therapy. Surgical history includes hysterectomy. No known relevant medical history. The patient is scheduled in a reminder system for screening mammography. 8-10% of cancers will be missed on mammography. Management of a palpable abnormality must be based on clinical grounds.  Patients will be notified of their results via letter from our facility. Accredited by [employer] and FDA. RISK ASSESSMENT: 5 Year Tyrer-Cuzick: 1.33% 10 Year Tyrer-Cuzick: 2.92% Lifetime Tyrer-Cuzick: 10.27% TISSUE DENSITY: The breasts are heterogeneously dense, which may obscure small masses. INDICATION: Nathalie Londono is a 53 y.o. female presenting for screening " mammography. FINDINGS: Bilateral There are no suspicious masses, grouped microcalcifications or areas of unexplained architectural distortion. The skin and nipple areolar complex are unremarkable.  Left superior breast asymmetry is unchanged since 12/18/2014.  Left breast 11:00 oval mass is unchanged since at least 7/12/2021, previously characterized as a cyst on ultrasound 7/29/2021.     Impression: No mammographic evidence of malignancy. ASSESSMENT/BI-RADS CATEGORY: Left: 2 - Benign Right: 1 - Negative Overall: 2 - Benign RECOMMENDATION:      - Routine screening mammogram in 1 year for both breasts. Workstation ID: [id number]     XR spine cervical complete 6+ vw flex/ext/obl    Result Date: 2/6/2024  Narrative: CERVICAL SPINE INDICATION:   Z12.31: Encounter for screening mammogram for malignant neoplasm of breast M54.2: Cervicalgia. COMPARISON:  None VIEWS:  XR SPINE CERVICAL COMPLETE 6+ VW FLEX /EXT /OBL FINDINGS: No fracture. Normal alignment without subluxation. There is minimal degenerative disc disease at C5-6 with very mild disc space narrowing and marginal osteophyte formation. Minimal loss of disc height at C4-5. No evidence of dynamic instability seen with flexion or extension. The prevertebral soft tissues are within normal limits. The obliquity for the left neural foramen is limited, neuroforamen not well visualized. Right-sided foramen widely patent. The lung apices are clear.     Impression: Early degenerative disc disease as above. Workstation performed: [id number]       ECG:  nsr      Review of Systems   Constitutional: Negative.   HENT: Negative.     Eyes: Negative.    Cardiovascular: Negative.    Respiratory: Negative.     Endocrine: Negative.    Hematologic/Lymphatic: Negative.    Skin: Negative.    Musculoskeletal: Negative.    Gastrointestinal: Negative.    Genitourinary: Negative.    Neurological: Negative.    Psychiatric/Behavioral: Negative.     All other systems reviewed and are  "negative.      Vitals:    02/08/24 1306   BP: 108/70   Pulse: 86     Vitals:    02/08/24 1306   Weight: 72.1 kg (159 lb)     Height: 5' 8\" (172.7 cm)   Body mass index is 24.18 kg/m².    Physical Exam:  Vital signs reviewed  General:  Alert and cooperative, appears stated age, no acute distress  HEENT:  PERRLA, EOMI, no scleral icterus, no conjunctival pallor  Neck:  No lymphadenopathy, no thyromegaly, no carotid bruits, no elevated JVP  Heart:  Regular rate and rhythm, normal S1/S2, no S3/S4, no murmur, rubs or gallops.  PMI nondisplaced  Lungs:  Clear to auscultation bilaterally, no wheezes rales or rhonchi  Abdomen:  Soft, non-tender, positive bowel sounds, no rebound or guarding,   no organomegaly   Extremities:  Normal range of motion.  No clubbing, cyanosis or edema   Vascular:  2+ pedal pulses  Skin:  No rashes or lesions on exposed skin  Neurologic:  Cranial nerves II-XII grossly intact without focal deficits  Psych:  Normal mood and affect      "

## 2024-02-19 ENCOUNTER — HOSPITAL ENCOUNTER (OUTPATIENT)
Dept: CT IMAGING | Facility: HOSPITAL | Age: 54
Discharge: HOME | End: 2024-02-19
Attending: INTERNAL MEDICINE
Payer: COMMERCIAL

## 2024-02-19 DIAGNOSIS — R07.89 OTHER CHEST PAIN: ICD-10-CM

## 2024-02-19 DIAGNOSIS — I34.1 MVP (MITRAL VALVE PROLAPSE): ICD-10-CM

## 2024-02-19 DIAGNOSIS — E78.2 MIXED HYPERLIPIDEMIA: ICD-10-CM

## 2024-02-19 DIAGNOSIS — R00.2 HEART PALPITATIONS: ICD-10-CM

## 2024-02-19 PROCEDURE — 75571 CT HRT W/O DYE W/CA TEST: CPT

## 2024-02-19 PROCEDURE — G1004 CDSM NDSC: HCPCS

## 2024-02-26 DIAGNOSIS — K76.0 STEATOSIS OF LIVER: Primary | ICD-10-CM

## 2024-03-14 ENCOUNTER — HOSPITAL ENCOUNTER (OUTPATIENT)
Dept: NON INVASIVE DIAGNOSTICS | Facility: HOSPITAL | Age: 54
Discharge: HOME | End: 2024-03-14
Attending: INTERNAL MEDICINE
Payer: COMMERCIAL

## 2024-03-14 ENCOUNTER — HOSPITAL ENCOUNTER (OUTPATIENT)
Dept: ULTRASOUND IMAGING | Facility: HOSPITAL | Age: 54
Discharge: HOME | End: 2024-03-14
Payer: COMMERCIAL

## 2024-03-14 VITALS
BODY MASS INDEX: 24.1 KG/M2 | HEIGHT: 68 IN | SYSTOLIC BLOOD PRESSURE: 108 MMHG | WEIGHT: 159 LBS | DIASTOLIC BLOOD PRESSURE: 70 MMHG | HEART RATE: 75 BPM

## 2024-03-14 DIAGNOSIS — K76.0 STEATOSIS OF LIVER: ICD-10-CM

## 2024-03-14 DIAGNOSIS — E78.2 MIXED HYPERLIPIDEMIA: ICD-10-CM

## 2024-03-14 DIAGNOSIS — R00.2 HEART PALPITATIONS: ICD-10-CM

## 2024-03-14 DIAGNOSIS — I34.1 MVP (MITRAL VALVE PROLAPSE): ICD-10-CM

## 2024-03-14 DIAGNOSIS — R07.89 OTHER CHEST PAIN: ICD-10-CM

## 2024-03-14 LAB
AORTIC ROOT: 3.4 CM
APICAL FOUR CHAMBER EJECTION FRACTION: 58 %
BSA FOR ECHO PROCEDURE: 1.85 M2
E WAVE DECELERATION TIME: 218 MS
E/A RATIO: 1.25
FRACTIONAL SHORTENING: 37 (ref 28–44)
INTERVENTRICULAR SEPTUM IN DIASTOLE (PARASTERNAL SHORT AXIS VIEW): 0.9 CM
INTERVENTRICULAR SEPTUM: 0.9 CM (ref 0.6–1.1)
LAAS-AP2: 12.9 CM2
LAAS-AP4: 13.5 CM2
LEFT ATRIUM SIZE: 3.2 CM
LEFT ATRIUM VOLUME (MOD BIPLANE): 34 ML
LEFT ATRIUM VOLUME INDEX (MOD BIPLANE): 18.4 ML/M2
LEFT INTERNAL DIMENSION IN SYSTOLE: 2.7 CM (ref 2.1–4)
LEFT VENTRICULAR INTERNAL DIMENSION IN DIASTOLE: 4.3 CM (ref 3.5–6)
LEFT VENTRICULAR POSTERIOR WALL IN END DIASTOLE: 1.1 CM
LEFT VENTRICULAR STROKE VOLUME: 55 ML
LVSV (TEICH): 55 ML
MV E'TISSUE VEL-SEP: 11 CM/S
MV PEAK A VEL: 0.56 M/S
MV PEAK E VEL: 70 CM/S
RA PRESSURE ESTIMATED: 5 MMHG
RIGHT ATRIUM AREA SYSTOLE A4C: 11.7 CM2
RIGHT VENTRICLE ID DIMENSION: 2.6 CM
RV PSP: 32 MMHG
SL CV LEFT ATRIUM LENGTH A2C: 4 CM
SL CV LV EF: 60
SL CV PED ECHO LEFT VENTRICLE DIASTOLIC VOLUME (MOD BIPLANE) 2D: 82 ML
SL CV PED ECHO LEFT VENTRICLE SYSTOLIC VOLUME (MOD BIPLANE) 2D: 28 ML
TR MAX PG: 27 MMHG
TR PEAK VELOCITY: 2.6 M/S
TRICUSPID ANNULAR PLANE SYSTOLIC EXCURSION: 1.9 CM
TRICUSPID VALVE PEAK REGURGITATION VELOCITY: 2.62 M/S

## 2024-03-14 PROCEDURE — 93306 TTE W/DOPPLER COMPLETE: CPT

## 2024-03-14 PROCEDURE — 93306 TTE W/DOPPLER COMPLETE: CPT | Performed by: INTERNAL MEDICINE

## 2024-03-14 PROCEDURE — 76705 ECHO EXAM OF ABDOMEN: CPT

## 2024-03-20 DIAGNOSIS — K76.0 STEATOSIS OF LIVER: Primary | ICD-10-CM

## 2024-03-26 ENCOUNTER — APPOINTMENT (OUTPATIENT)
Dept: LAB | Facility: HOSPITAL | Age: 54
End: 2024-03-26
Payer: COMMERCIAL

## 2024-03-26 ENCOUNTER — HOSPITAL ENCOUNTER (OUTPATIENT)
Dept: ULTRASOUND IMAGING | Facility: HOSPITAL | Age: 54
Discharge: HOME | End: 2024-03-26
Payer: COMMERCIAL

## 2024-03-26 DIAGNOSIS — K76.0 STEATOSIS OF LIVER: ICD-10-CM

## 2024-03-26 DIAGNOSIS — Z00.8 ENCOUNTER FOR OTHER GENERAL EXAMINATION: ICD-10-CM

## 2024-03-26 LAB
CHOLEST SERPL-MCNC: 122 MG/DL
EST. AVERAGE GLUCOSE BLD GHB EST-MCNC: 103 MG/DL
HBA1C MFR BLD: 5.2 %
HDLC SERPL-MCNC: 37 MG/DL
LDLC SERPL CALC-MCNC: 67 MG/DL (ref 0–100)
NONHDLC SERPL-MCNC: 85 MG/DL
TRIGL SERPL-MCNC: 91 MG/DL

## 2024-03-26 PROCEDURE — 36415 COLL VENOUS BLD VENIPUNCTURE: CPT

## 2024-03-26 PROCEDURE — 83036 HEMOGLOBIN GLYCOSYLATED A1C: CPT

## 2024-03-26 PROCEDURE — 80061 LIPID PANEL: CPT

## 2024-03-26 PROCEDURE — 76981 USE PARENCHYMA: CPT

## 2024-04-15 ENCOUNTER — APPOINTMENT (OUTPATIENT)
Dept: LAB | Facility: HOSPITAL | Age: 54
End: 2024-04-15
Payer: COMMERCIAL

## 2024-04-15 ENCOUNTER — OFFICE VISIT (OUTPATIENT)
Dept: CARDIOLOGY CLINIC | Facility: HOSPITAL | Age: 54
End: 2024-04-15
Payer: COMMERCIAL

## 2024-04-15 VITALS
BODY MASS INDEX: 22.37 KG/M2 | OXYGEN SATURATION: 96 % | SYSTOLIC BLOOD PRESSURE: 130 MMHG | WEIGHT: 147.6 LBS | HEIGHT: 68 IN | DIASTOLIC BLOOD PRESSURE: 80 MMHG | HEART RATE: 85 BPM

## 2024-04-15 DIAGNOSIS — R07.89 OTHER CHEST PAIN: ICD-10-CM

## 2024-04-15 DIAGNOSIS — E78.2 MIXED HYPERLIPIDEMIA: ICD-10-CM

## 2024-04-15 DIAGNOSIS — E53.8 VITAMIN B 12 DEFICIENCY: ICD-10-CM

## 2024-04-15 DIAGNOSIS — R00.2 HEART PALPITATIONS: ICD-10-CM

## 2024-04-15 DIAGNOSIS — R40.0 DAYTIME SOMNOLENCE: ICD-10-CM

## 2024-04-15 DIAGNOSIS — I34.1 MVP (MITRAL VALVE PROLAPSE): Primary | ICD-10-CM

## 2024-04-15 DIAGNOSIS — E55.9 VITAMIN D DEFICIENCY: ICD-10-CM

## 2024-04-15 LAB — VIT B12 SERPL-MCNC: 183 PG/ML (ref 180–914)

## 2024-04-15 PROCEDURE — 82607 VITAMIN B-12: CPT | Performed by: INTERNAL MEDICINE

## 2024-04-15 PROCEDURE — 99214 OFFICE O/P EST MOD 30 MIN: CPT | Performed by: INTERNAL MEDICINE

## 2024-04-15 PROCEDURE — 36415 COLL VENOUS BLD VENIPUNCTURE: CPT

## 2024-04-15 PROCEDURE — 82652 VIT D 1 25-DIHYDROXY: CPT

## 2024-04-16 NOTE — PROGRESS NOTES
Nathalie Spring  1970  [id number]  [facility] [address] [id number] [phone]    1. MVP (mitral valve prolapse)        2. Other chest pain        3. Heart palpitations  Ambulatory Referral to Sleep Medicine    AMB extended holter monitor      4. Mixed hyperlipidemia  Ambulatory Referral to Sleep Medicine    AMB extended holter monitor      5. Daytime somnolence  Ambulatory Referral to Sleep Medicine    AMB extended holter monitor      6. Vitamin B 12 deficiency  Vitamin B12      7. Vitamin D deficiency  Vitamin D 1,25 dihydroxy          Discussion/Summary: She continues to have some occasional palpitations.  I recommended an ambulatory Holter monitor to evaluate for rhythm/symptom correlation.  Recent calcium score showed 0.  Echocardiogram showed mild to moderate tricuspid regurgitation on my review.  Will plan a 1 year follow-up study to confirm severity of TR.  RV function is preserved.  Blood pressure is well-controlled.  She does have some ongoing issues with fatigue I have recommended checking vitamin B12 and vitamin D levels.    Interval History: 53-year-old female with a history of mitral valve prolapse, borderline hypertension, hyperlipidemia presents for new patient consult on behalf of Dr. Weiner.  Functional capacity has been good.  She gets occasional palpitations these are short-lived and occur once or twice a month.  She denies any significant change in her functional capacity.  She has small aches and pains sharp pains in her chest but has a history of fibromyalgia.  These do not sound anginal in nature.  Denies any lower extremity edema, PND, orthopnea.  She is a non-smoker.  No significant alcohol intake.  Moderate hydration.  Mild caffeine use.    Since her last visit she has been doing somewhat better however still has some issues with palpitations and generalized fatigue.  Denies any anginal sounding chest pain or  discomfort.  Breathing has been stable.  Denies any lower extremity edema, PND, orthopnea.  She has been taking all medications as prescribed.    Medical Problems       Problem List       Gastroesophageal reflux disease with esophagitis without hemorrhage    Irritable bowel syndrome with both constipation and diarrhea    Steatosis of liver    Mixed hyperlipidemia    MVP (mitral valve prolapse)    Heart palpitations    Other chest pain        Past Medical History:   Diagnosis Date    Bladder cancer (HCC)     at age of 30    Colon polyp     stomach polyps    Disease of thyroid gland     GERD (gastroesophageal reflux disease)     Hypertension     MVP (mitral valve prolapse)      Social History     Socioeconomic History    Marital status: /Civil Union     Spouse name: Not on file    Number of children: Not on file    Years of education: Not on file    Highest education level: Not on file   Occupational History    Not on file   Tobacco Use    Smoking status: Never    Smokeless tobacco: Never   Vaping Use    Vaping status: Never Used   Substance and Sexual Activity    Alcohol use: Yes     Comment: occasional    Drug use: Never    Sexual activity: Not on file   Other Topics Concern    Not on file   Social History Narrative    Not on file     Social Determinants of Health     Financial Resource Strain: Not on file   Food Insecurity: Not on file   Transportation Needs: Not on file   Physical Activity: Not on file   Stress: Not on file   Social Connections: Not on file   Intimate Partner Violence: Not on file   Housing Stability: Not on file      Family History   Problem Relation Age of Onset    Diabetes Mother     Cancer Mother     Diabetes Father     Hypertension Father     Hyperlipidemia Father     No Known Problems Sister     No Known Problems Maternal Grandmother     No Known Problems Paternal Grandmother     No Known Problems Sister     No Known Problems Maternal Aunt     Lymphoma Maternal Aunt     No Known  "Problems Paternal Aunt     No Known Problems Paternal Aunt     Breast cancer Neg Hx      Past Surgical History:   Procedure Laterality Date    BACK SURGERY      CHOLECYSTECTOMY      HYSTERECTOMY      TRANSURETHRAL RESECTION OF BLADDER         Current Outpatient Medications:     atorvastatin (LIPITOR) 10 mg tablet, , Disp: , Rfl:     Cholecalciferol (VITAMIN D3) 125 MCG (5000 UT) CAPS, , Disp: , Rfl:     estradiol (VAGIFEM, YUVAFEM) 10 MCG TABS vaginal tablet, Insert into the vagina once a week , Disp: , Rfl:     famotidine (PEPCID) 40 MG tablet, TAKE ONE TABLET BY MOUTH EVERY DAY AT BEDTIME AS NEEDED FOR HEARTBURN, Disp: 90 tablet, Rfl: 0    levothyroxine 25 mcg tablet, , Disp: , Rfl:     metoprolol succinate (TOPROL-XL) 25 mg 24 hr tablet, Take 12.5 mg by mouth daily, Disp: , Rfl:     dicyclomine (BENTYL) 10 mg capsule, Take 1 PO TID PRN for abdominal pain/bloating., Disp: 90 capsule, Rfl: 3    DULoxetine (CYMBALTA) 20 mg capsule, Take 20 mg by mouth daily, Disp: , Rfl:     TiZANidine (Zanaflex) 2 MG capsule, Take 2 mg by mouth 3 (three) times a day, Disp: , Rfl:   No Known Allergies    Labs:     Chemistry        Component Value Date/Time    K 4.1 12/09/2023 1048    K 4.4 04/13/2018 0850     12/09/2023 1048     04/13/2018 0850    CO2 30 12/09/2023 1048    CO2 27 04/13/2018 0850    BUN 15 12/09/2023 1048    BUN 12 04/13/2018 0850    CREATININE 0.72 12/09/2023 1048    CREATININE 0.8 04/13/2018 0850        Component Value Date/Time    CALCIUM 9.9 12/09/2023 1048    CALCIUM 9.8 04/13/2018 0850    ALKPHOS 54 12/09/2023 1048    ALKPHOS 35 04/13/2018 0850    AST 19 12/09/2023 1048    AST 16 04/13/2018 0850    ALT 22 12/09/2023 1048    ALT 18 04/13/2018 0850            No results found for: \"CHOL\"  Lab Results   Component Value Date    HDL 37 (L) 03/26/2024    HDL 34 (L) 08/16/2023    HDL 42 (L) 08/02/2022     Lab Results   Component Value Date    LDLCALC 67 03/26/2024    LDLCALC  08/16/2023      Comment:      " "Calculated LDL invalid, triglycerides >400 mg/dl  This screening LDL is a calculated result.   It does not have the accuracy of the Direct Measured LDL in the monitoring of patients with hyperlipidemia and/or statin therapy.   Direct Measure LDL (NEC216) must be ordered separately in these patients.    LDLCALC 127 (H) 08/02/2022     Lab Results   Component Value Date    TRIG 91 03/26/2024    TRIG 133 02/05/2024    TRIG 561 (H) 08/16/2023     No results found for: \"CHOLHDL\"    Imaging: Mammo screening bilateral w 3d & cad    Result Date: 2/7/2024  Narrative: DIAGNOSIS: Encounter for screening mammogram for malignant neoplasm of breast TECHNIQUE: Digital screening mammography was performed. Computer Aided Detection (CAD) analyzed all applicable images. COMPARISONS: Prior breast imaging dated: 12/12/2022, 12/12/2022, 12/06/2022, 07/29/2021, 07/29/2021, 07/12/2021, 07/12/2021, 07/12/2021, 10/21/2019, 03/23/2018, 03/23/2018, 03/16/2018, 03/16/2018, 11/30/2016, 11/30/2016, 12/02/2015, 12/02/2015, 12/24/2014, and 12/18/2014 RELEVANT HISTORY: Family Breast Cancer History: History of breast cancer in Neg Hx. Family Medical History: No known relevant family medical history. Personal History: Hormone history includes estrogen replacement therapy. Surgical history includes hysterectomy. No known relevant medical history. The patient is scheduled in a reminder system for screening mammography. 8-10% of cancers will be missed on mammography. Management of a palpable abnormality must be based on clinical grounds.  Patients will be notified of their results via letter from our facility. Accredited by [employer] and FDA. RISK ASSESSMENT: 5 Year Tyrer-Cuzick: 1.33% 10 Year Tyrer-Cuzick: 2.92% Lifetime Tyrer-Cuzick: 10.27% TISSUE DENSITY: The breasts are heterogeneously dense, which may obscure small masses. INDICATION: Nathalie Londono is a 53 y.o. female presenting for screening mammography. FINDINGS: Bilateral " There are no suspicious masses, grouped microcalcifications or areas of unexplained architectural distortion. The skin and nipple areolar complex are unremarkable.  Left superior breast asymmetry is unchanged since 12/18/2014.  Left breast 11:00 oval mass is unchanged since at least 7/12/2021, previously characterized as a cyst on ultrasound 7/29/2021.     Impression: No mammographic evidence of malignancy. ASSESSMENT/BI-RADS CATEGORY: Left: 2 - Benign Right: 1 - Negative Overall: 2 - Benign RECOMMENDATION:      - Routine screening mammogram in 1 year for both breasts. Workstation ID: [id number]     XR spine cervical complete 6+ vw flex/ext/obl    Result Date: 2/6/2024  Narrative: CERVICAL SPINE INDICATION:   Z12.31: Encounter for screening mammogram for malignant neoplasm of breast M54.2: Cervicalgia. COMPARISON:  None VIEWS:  XR SPINE CERVICAL COMPLETE 6+ VW FLEX /EXT /OBL FINDINGS: No fracture. Normal alignment without subluxation. There is minimal degenerative disc disease at C5-6 with very mild disc space narrowing and marginal osteophyte formation. Minimal loss of disc height at C4-5. No evidence of dynamic instability seen with flexion or extension. The prevertebral soft tissues are within normal limits. The obliquity for the left neural foramen is limited, neuroforamen not well visualized. Right-sided foramen widely patent. The lung apices are clear.     Impression: Early degenerative disc disease as above. Workstation performed: [id number]       ECG:  nsr      Review of Systems   Constitutional: Negative.   HENT: Negative.     Eyes: Negative.    Cardiovascular: Negative.    Respiratory: Negative.     Endocrine: Negative.    Hematologic/Lymphatic: Negative.    Skin: Negative.    Musculoskeletal: Negative.    Gastrointestinal: Negative.    Genitourinary: Negative.    Neurological: Negative.    Psychiatric/Behavioral: Negative.     All other systems reviewed and are negative.      Vitals:    04/15/24 1552  "  BP: 130/80   Pulse: 85   SpO2: 96%     Vitals:    04/15/24 1552   Weight: 67 kg (147 lb 9.6 oz)     Height: 5' 8\" (172.7 cm)   Body mass index is 22.44 kg/m².    Physical Exam:  Vital signs reviewed  General:  Alert and cooperative, appears stated age, no acute distress  HEENT:  PERRLA, EOMI, no scleral icterus, no conjunctival pallor  Neck:  No lymphadenopathy, no thyromegaly, no carotid bruits, no elevated JVP  Heart:  Regular rate and rhythm, normal S1/S2, no S3/S4, no murmur, rubs or gallops.  PMI nondisplaced  Lungs:  Clear to auscultation bilaterally, no wheezes rales or rhonchi  Abdomen:  Soft, non-tender, positive bowel sounds, no rebound or guarding,   no organomegaly   Extremities:  Normal range of motion.  No clubbing, cyanosis or edema   Vascular:  2+ pedal pulses  Skin:  No rashes or lesions on exposed skin  Neurologic:  Cranial nerves II-XII grossly intact without focal deficits  Psych:  Normal mood and affect        "

## 2024-04-17 DIAGNOSIS — R40.0 DAYTIME SOMNOLENCE: Primary | ICD-10-CM

## 2024-04-17 DIAGNOSIS — E78.2 MIXED HYPERLIPIDEMIA: ICD-10-CM

## 2024-04-17 DIAGNOSIS — R00.2 HEART PALPITATIONS: ICD-10-CM

## 2024-04-17 LAB — 1,25(OH)2D3 SERPL-MCNC: 58.3 PG/ML (ref 24.8–81.5)

## 2024-04-24 ENCOUNTER — DOCUMENTATION (OUTPATIENT)
Dept: CARDIOLOGY CLINIC | Facility: HOSPITAL | Age: 54
End: 2024-04-24

## 2024-04-24 NOTE — PROGRESS NOTES
1 week zio xt ordered by Dr. Lorenzana  for heart palpitations, mixed hyperlipidemia, and daytime somnolence. Sent home today 4/24. [employer] approved from 4/17/24 - 7/17/24  Authorization #: [id number].

## 2024-04-29 ENCOUNTER — OFFICE VISIT (OUTPATIENT)
Dept: GASTROENTEROLOGY | Facility: CLINIC | Age: 54
End: 2024-04-29
Payer: COMMERCIAL

## 2024-04-29 VITALS
DIASTOLIC BLOOD PRESSURE: 81 MMHG | TEMPERATURE: 98.3 F | HEIGHT: 68 IN | WEIGHT: 145.4 LBS | HEART RATE: 68 BPM | SYSTOLIC BLOOD PRESSURE: 128 MMHG | BODY MASS INDEX: 22.04 KG/M2 | OXYGEN SATURATION: 98 %

## 2024-04-29 DIAGNOSIS — K76.0 STEATOSIS OF LIVER: ICD-10-CM

## 2024-04-29 DIAGNOSIS — K21.00 GASTROESOPHAGEAL REFLUX DISEASE WITH ESOPHAGITIS WITHOUT HEMORRHAGE: Primary | ICD-10-CM

## 2024-04-29 DIAGNOSIS — K58.2 IRRITABLE BOWEL SYNDROME WITH BOTH CONSTIPATION AND DIARRHEA: ICD-10-CM

## 2024-04-29 DIAGNOSIS — Z12.11 SCREENING FOR COLON CANCER: ICD-10-CM

## 2024-04-29 PROCEDURE — 99214 OFFICE O/P EST MOD 30 MIN: CPT | Performed by: NURSE PRACTITIONER

## 2024-04-29 RX ORDER — FAMOTIDINE 20 MG/1
TABLET, FILM COATED ORAL
Qty: 90 TABLET | Refills: 1 | Status: SHIPPED | OUTPATIENT
Start: 2024-04-29

## 2024-04-29 NOTE — PATIENT INSTRUCTIONS
Decrease famotidine to 20 mg daily and then eventually wean off and use as needed.   Encouraged the patient to avoid acidic or trigger foods including alcohol as much as possible.  Encouraged the patient to consider purchasing a wedge pillow to help prevent reflux symptoms while sleeping.  Encouraged the patient not to lay down or sleep for at least 3 to 4 hours after eating and to try to avoid late night snacking.   Continue to try to drink at least 64 oz of water daily.   Schedule a f/u OV in 6 months.   Continue to watch for red flag symptoms.       We did review GI red flag symptoms, including, but not limited to: chronic nausea, vomiting, diarrhea, chills, fever, and unintentional weight loss and should call or contact our office with any changes or concerns. I reviewed with the patient that if they notice any blood while vomiting or in their stool they should contact or office or go to the nearest emergency room for immediate evaluation. The patient was agreeable and verbalized an understanding.

## 2024-04-29 NOTE — PROGRESS NOTES
[facility] - Outpatient Follow-up Note  Nathalie Londono 54 y.o. female MRN: [id number]  Encounter: [id number]          ASSESSMENT AND PLAN:    The patient presents today for follow-up office visit regarding her chronic GERD symptoms, irritable bowel syndrome with constipation and diarrhea and a history of steatosis of the liver.    Exam: Abdomen is soft, nondistended, nontender, with hypoactive bowel sounds x 4. No edema noted of the b/l lower extremities upon exam today. Skin is non-icteric.      1. Gastroesophageal reflux disease with esophagitis without hemorrhage  Improved/Stable    As per the patient's wishes we will have her decrease the famotidine down to 20 mg daily and then eventually try to titrate off of the famotidine with every other day dosing and then just utilizing the famotidine as needed for any breakthrough reflux symptoms.  The patient was agreeable and verbalized an understanding.    Encouraged the patient to avoid acidic or trigger foods including alcohol as much as possible.  Encouraged the patient to consider purchasing a wedge pillow to help prevent reflux symptoms while sleeping.  Encouraged the patient not to lay down or sleep for at least 3 to 4 hours after eating and to try to avoid late night snacking.     - famotidine (PEPCID) 20 mg tablet; Take 1 PO HS.  Dispense: 90 tablet; Refill: 1    2. Irritable bowel syndrome with both constipation and diarrhea  Stable    Encouraged the patient continue to try to drink at least 64 ounces of water daily.    3. Steatosis of liver  During today's office visit, I reviewed the patient's previous blood work results from 12/9/23 and since they were normal, we will proceed with repeat blood work in June 2024.  We will contact the patient once we have the results of the blood work to review the results and discuss any other treatment plan recommendations.  The patient was agreeable and verbalized an  understanding.    Last Liver Imaging: 3/26/24 U/S Elastography  Repeat Imaging Ordered: No    Continue to work on diet/weight loss.  Continue to eat lower fat, higher fiber foods on a more regular daily basis.   Continue to keep alcohol consumption to a minimum.   Continue to work on work on adequate management of contributing health issues such as: high blood pressure, high cholesterol, diabetes, etc.      - CBC and differential; Future  - Comprehensive metabolic panel; Future  - Protime-INR; Future  - Bilirubin, direct; Future    4. Screening for colon cancer  We will hold off on any repeat colonoscopies until the recommended surveillance date unless the patient would start to develop red flag symptoms in the future.  The patient was agreeable and verbalized an understanding.  DUE: 7/2/29    Reviewed GI red flag symptoms, including, but not limited to: chronic nausea, vomiting, diarrhea, chills, fever, and unintentional weight loss and should call or contact our office with any changes or concerns. I reviewed with the patient that if they notice any blood while vomiting or in their stool they should contact or office or go to the nearest emergency room for immediate evaluation. The patient was agreeable and verbalized an understanding.      The patient will schedule a follow up office visit in 6 months, but understands to call or contact our office if there are any issues or concerns in the mean time.  ______________________________________________________________________    SUBJECTIVE: Patient is a 54 y.o. female who was previously seen in our office on 11/6/23 and presents today for follow-up office visit regarding her chronic GERD symptoms, irritable bowel syndrome with constipation and diarrhea and a history of steatosis of the liver.  The patient reports that since her last office visit she feels that overall her GERD symptoms and IBS symptoms have improved with the significant changes in her diet, exercise,  and lifestyle.  The patient reports that she continues with the famotidine 40 mg at bedtime but would like to discuss titrating down and hopefully off of the famotidine on a regular basis as she prefers to take the least amount of medications possible.    Since her last office visit she did proceed with an ultrasound elastography as well as a right upper quadrant ultrasound which did show very mild steatosis and absent or mild fibrosis.    The patient denies any reflux, nausea, dysphagia, vomiting, decreased appetite, unplanned weight loss, or abdominal pain. Water Intake: as close to 64 oz a day as possible per day.    The patient reports that they have a BM 2 days ago and reports that it is relieving, without any bloating, consistent diarrhea, nocturnal BMs, constipation, straining, melena or bloody stools. Charles Mix: 4. Last BM: 2 days ago. Flatus: Occasionally.    Meds: Famotidine 40 mg daily.  Daily NSAID Use: Denied    Imaging: (3/26/24): Ultrasound Elastography: F0-F1 and S0-S1    Endoscopy History: EGD: (2/8/23): Ten or more sessile fundic gland polyps measuring less than 5 mm. Path: Mild esophagitis     COLONOSCOPY: (7/2/19): Normal. Random biopsies obtained, normal.     DUE: 7/2/29    REVIEW OF SYSTEMS IS OTHERWISE NEGATIVE.      Historical Information   Past Medical History:   Diagnosis Date    Bladder cancer (HCC)     at age of 30    Colon polyp     stomach polyps    Disease of thyroid gland     GERD (gastroesophageal reflux disease)     Hypertension     MVP (mitral valve prolapse)      Past Surgical History:   Procedure Laterality Date    BACK SURGERY      CHOLECYSTECTOMY      HYSTERECTOMY      TRANSURETHRAL RESECTION OF BLADDER       Social History   Social History     Substance and Sexual Activity   Alcohol Use Yes    Comment: occasional     Social History     Substance and Sexual Activity   Drug Use Never     Social History     Tobacco Use   Smoking Status Never   Smokeless Tobacco Never     Family  History   Problem Relation Age of Onset    Diabetes Mother     Cancer Mother     Diabetes Father     Hypertension Father     Hyperlipidemia Father     No Known Problems Sister     No Known Problems Maternal Grandmother     No Known Problems Paternal Grandmother     No Known Problems Sister     No Known Problems Maternal Aunt     Lymphoma Maternal Aunt     No Known Problems Paternal Aunt     No Known Problems Paternal Aunt     Breast cancer Neg Hx        Meds/Allergies       Current Outpatient Medications:     atorvastatin (LIPITOR) 10 mg tablet    Cholecalciferol (VITAMIN D3) 125 MCG (5000 UT) CAPS    dicyclomine (BENTYL) 10 mg capsule    DULoxetine (CYMBALTA) 20 mg capsule    estradiol (VAGIFEM, YUVAFEM) 10 MCG TABS vaginal tablet    famotidine (PEPCID) 40 MG tablet    levothyroxine 25 mcg tablet    metoprolol succinate (TOPROL-XL) 25 mg 24 hr tablet    TiZANidine (Zanaflex) 2 MG capsule    No Known Allergies        Objective     not currently breastfeeding. There is no height or weight on file to calculate BMI.      PHYSICAL EXAM:      General Appearance:   Alert, cooperative, no distress   HEENT:   Normocephalic, atraumatic, anicteric.     Neck:  Supple, symmetrical, trachea midline   Lungs:   Clear to auscultation bilaterally; no rales, rhonchi or wheezing; respirations unlabored    Heart::   Regular rate and rhythm; no murmur, rub, or gallop.   Abdomen:   Soft, non-tender, non-distended; normal bowel sounds; no masses, no organomegaly    Genitalia:   Deferred    Rectal:   Deferred    Extremities:  No cyanosis, clubbing or edema    Pulses:  2+ and symmetric    Skin:  No jaundice, rashes, or lesions    Lymph nodes:  No palpable cervical lymphadenopathy        Lab Results:   No visits with results within 1 Day(s) from this visit.   Latest known visit with results is:   Appointment on 04/15/2024   Component Date Value    Vit D, 1,25-Dihydroxy 04/15/2024 58.3          Radiology Results:   No results found.   Answers  submitted by the patient for this visit:  Abdominal Pain Questionnaire (Submitted on 4/26/2024)  Chief Complaint: Abdominal pain  Chronicity: recurrent  Onset: more than 1 year ago  Onset quality: gradual  Frequency: intermittently  Episode duration: 5 Days  Progression since onset: waxing and waning  Pain location: LUQ, RUQ, left flank, right flank  Pain - numeric: 3/10  Pain quality: aching, dull, a sensation of fullness  Radiates to: back  anorexia: Yes  arthralgias: Yes  constipation: Yes  diarrhea: No  dysuria: No  fever: No  flatus: No  frequency: No  headaches: Yes  hematochezia: No  hematuria: No  melena: No  myalgias: Yes  nausea: No  weight loss: No  vomiting: No  Aggravated by: nothing  Relieved by: nothing  Diagnostic workup: ultrasound

## 2024-05-14 ENCOUNTER — CLINICAL SUPPORT (OUTPATIENT)
Dept: CARDIOLOGY CLINIC | Facility: HOSPITAL | Age: 54
End: 2024-05-14
Payer: COMMERCIAL

## 2024-05-14 DIAGNOSIS — R40.0 DAYTIME SOMNOLENCE: ICD-10-CM

## 2024-05-14 DIAGNOSIS — E78.2 MIXED HYPERLIPIDEMIA: ICD-10-CM

## 2024-05-14 DIAGNOSIS — R00.2 HEART PALPITATIONS: ICD-10-CM

## 2024-05-14 PROCEDURE — 93244 EXT ECG>48HR<7D REV&INTERPJ: CPT | Performed by: INTERNAL MEDICINE

## 2024-05-15 ENCOUNTER — TELEPHONE (OUTPATIENT)
Dept: CARDIOLOGY CLINIC | Facility: CLINIC | Age: 54
End: 2024-05-15

## 2024-05-15 NOTE — TELEPHONE ENCOUNTER
----- Message from Seng Lorenzana DO sent at 5/15/2024  7:59 AM EDT -----  Let her know monitor was normal.  thanks

## 2024-05-24 ENCOUNTER — HOSPITAL ENCOUNTER (OUTPATIENT)
Dept: SLEEP CENTER | Facility: HOSPITAL | Age: 54
Discharge: HOME | End: 2024-05-24
Payer: COMMERCIAL

## 2024-05-24 DIAGNOSIS — E78.2 MIXED HYPERLIPIDEMIA: ICD-10-CM

## 2024-05-24 DIAGNOSIS — R00.2 HEART PALPITATIONS: ICD-10-CM

## 2024-05-24 DIAGNOSIS — R40.0 DAYTIME SOMNOLENCE: ICD-10-CM

## 2024-05-24 PROCEDURE — G0399 HOME SLEEP TEST/TYPE 3 PORTA: HCPCS

## 2024-05-25 NOTE — PROGRESS NOTES
Home Sleep Study Documentation    HOME STUDY DEVICE: Noxturnal no                                           Lori G3 yes      Pre-Sleep Home Study:    Set-up and instructions performed by: Elida Frank    Technician performed demonstration for Patient: yes    Return demonstration performed by Patient: yes    Written instructions provided to Patient: yes    Patient signed consent form: yes        Post-Sleep Home Study:    Additional comments by Patient: N/A    Home Sleep Study Failed:no:    Failure reason: N/A    Reported or Detected: N/A    Scored by: KHOI Chan

## 2024-05-29 PROBLEM — Z12.11 SCREENING FOR COLON CANCER: Status: RESOLVED | Noted: 2023-11-06 | Resolved: 2024-05-29

## 2024-05-30 ENCOUNTER — TELEPHONE (OUTPATIENT)
Dept: CARDIOLOGY CLINIC | Facility: HOSPITAL | Age: 54
End: 2024-05-30

## 2024-05-31 PROBLEM — R40.0 DAYTIME SOMNOLENCE: Status: ACTIVE | Noted: 2024-05-31

## 2024-05-31 PROBLEM — G47.33 OSA (OBSTRUCTIVE SLEEP APNEA): Status: ACTIVE | Noted: 2024-05-31

## 2024-05-31 PROBLEM — R06.83 SNORING: Status: ACTIVE | Noted: 2024-05-31

## 2024-05-31 PROCEDURE — 95806 SLEEP STUDY UNATT&RESP EFFT: CPT

## 2024-06-20 ENCOUNTER — TELEPHONE (OUTPATIENT)
Dept: SLEEP CENTER | Facility: CLINIC | Age: 54
End: 2024-06-20

## 2024-06-20 NOTE — TELEPHONE ENCOUNTER
Home sleep study resulted, does not support the diagnosis of HENRRY.  Mild intermittent bradycardia was noted.  Patient to follow up with the ordering provider, Dr. Lorenzana.    Call placed to patient, advised of above.  Patient verbalized understanding.

## 2024-07-23 ENCOUNTER — APPOINTMENT (OUTPATIENT)
Dept: LAB | Facility: CLINIC | Age: 54
End: 2024-07-23
Payer: COMMERCIAL

## 2024-07-23 DIAGNOSIS — K76.0 STEATOSIS OF LIVER: ICD-10-CM

## 2024-07-23 LAB
ALBUMIN SERPL BCG-MCNC: 4.7 G/DL (ref 3.5–5)
ALP SERPL-CCNC: 52 U/L (ref 34–104)
ALT SERPL W P-5'-P-CCNC: 14 U/L (ref 7–52)
ANION GAP SERPL CALCULATED.3IONS-SCNC: 7 MMOL/L (ref 4–13)
AST SERPL W P-5'-P-CCNC: 14 U/L (ref 13–39)
BASOPHILS # BLD AUTO: 0.05 THOUSANDS/ÂΜL (ref 0–0.1)
BASOPHILS NFR BLD AUTO: 1 % (ref 0–1)
BILIRUB DIRECT SERPL-MCNC: 0.13 MG/DL (ref 0–0.2)
BILIRUB SERPL-MCNC: 0.7 MG/DL (ref 0.2–1)
BUN SERPL-MCNC: 12 MG/DL (ref 5–25)
CALCIUM SERPL-MCNC: 10.2 MG/DL (ref 8.4–10.2)
CHLORIDE SERPL-SCNC: 104 MMOL/L (ref 96–108)
CO2 SERPL-SCNC: 30 MMOL/L (ref 21–32)
CREAT SERPL-MCNC: 0.71 MG/DL (ref 0.6–1.3)
EOSINOPHIL # BLD AUTO: 0.19 THOUSAND/ÂΜL (ref 0–0.61)
EOSINOPHIL NFR BLD AUTO: 3 % (ref 0–6)
ERYTHROCYTE [DISTWIDTH] IN BLOOD BY AUTOMATED COUNT: 13 % (ref 11.6–15.1)
GFR SERPL CREATININE-BSD FRML MDRD: 96 ML/MIN/1.73SQ M
GLUCOSE P FAST SERPL-MCNC: 87 MG/DL (ref 65–99)
HCT VFR BLD AUTO: 44.1 % (ref 34.8–46.1)
HGB BLD-MCNC: 14.6 G/DL (ref 11.5–15.4)
IMM GRANULOCYTES # BLD AUTO: 0.02 THOUSAND/UL (ref 0–0.2)
IMM GRANULOCYTES NFR BLD AUTO: 0 % (ref 0–2)
INR PPP: 1.05 (ref 0.84–1.19)
LYMPHOCYTES # BLD AUTO: 2.31 THOUSANDS/ÂΜL (ref 0.6–4.47)
LYMPHOCYTES NFR BLD AUTO: 39 % (ref 14–44)
MCH RBC QN AUTO: 31.8 PG (ref 26.8–34.3)
MCHC RBC AUTO-ENTMCNC: 33.1 G/DL (ref 31.4–37.4)
MCV RBC AUTO: 96 FL (ref 82–98)
MONOCYTES # BLD AUTO: 0.4 THOUSAND/ÂΜL (ref 0.17–1.22)
MONOCYTES NFR BLD AUTO: 7 % (ref 4–12)
NEUTROPHILS # BLD AUTO: 2.99 THOUSANDS/ÂΜL (ref 1.85–7.62)
NEUTS SEG NFR BLD AUTO: 50 % (ref 43–75)
NRBC BLD AUTO-RTO: 0 /100 WBCS
PLATELET # BLD AUTO: 238 THOUSANDS/UL (ref 149–390)
PMV BLD AUTO: 10.4 FL (ref 8.9–12.7)
POTASSIUM SERPL-SCNC: 4.6 MMOL/L (ref 3.5–5.3)
PROT SERPL-MCNC: 7.2 G/DL (ref 6.4–8.4)
PROTHROMBIN TIME: 13.6 SECONDS (ref 11.6–14.5)
RBC # BLD AUTO: 4.59 MILLION/UL (ref 3.81–5.12)
SODIUM SERPL-SCNC: 141 MMOL/L (ref 135–147)
WBC # BLD AUTO: 5.96 THOUSAND/UL (ref 4.31–10.16)

## 2024-07-23 PROCEDURE — 36415 COLL VENOUS BLD VENIPUNCTURE: CPT

## 2024-07-23 PROCEDURE — 85025 COMPLETE CBC W/AUTO DIFF WBC: CPT

## 2024-07-23 PROCEDURE — 82248 BILIRUBIN DIRECT: CPT

## 2024-07-23 PROCEDURE — 85610 PROTHROMBIN TIME: CPT

## 2024-07-23 PROCEDURE — 80053 COMPREHEN METABOLIC PANEL: CPT

## 2024-08-20 ENCOUNTER — APPOINTMENT (OUTPATIENT)
Dept: LAB | Facility: CLINIC | Age: 54
End: 2024-08-20
Payer: COMMERCIAL

## 2024-08-20 DIAGNOSIS — R42 LIGHTHEADEDNESS: ICD-10-CM

## 2024-08-20 DIAGNOSIS — E06.3 HYPOTHYROIDISM DUE TO HASHIMOTO'S THYROIDITIS: ICD-10-CM

## 2024-08-20 DIAGNOSIS — E03.8 HYPOTHYROIDISM DUE TO HASHIMOTO'S THYROIDITIS: ICD-10-CM

## 2024-08-20 LAB
FERRITIN SERPL-MCNC: 122 NG/ML (ref 11–307)
IRON SATN MFR SERPL: 30 % (ref 15–50)
IRON SERPL-MCNC: 89 UG/DL (ref 50–212)
TIBC SERPL-MCNC: 300 UG/DL (ref 250–450)
TSH SERPL DL<=0.05 MIU/L-ACNC: 2.57 UIU/ML (ref 0.45–4.5)
UIBC SERPL-MCNC: 211 UG/DL (ref 155–355)

## 2024-08-20 PROCEDURE — 36415 COLL VENOUS BLD VENIPUNCTURE: CPT

## 2024-08-20 PROCEDURE — 83540 ASSAY OF IRON: CPT

## 2024-08-20 PROCEDURE — 84443 ASSAY THYROID STIM HORMONE: CPT

## 2024-08-20 PROCEDURE — 82728 ASSAY OF FERRITIN: CPT

## 2024-08-20 PROCEDURE — 83550 IRON BINDING TEST: CPT

## 2024-10-01 ENCOUNTER — OFFICE VISIT (OUTPATIENT)
Dept: CARDIOLOGY CLINIC | Facility: HOSPITAL | Age: 54
End: 2024-10-01
Payer: COMMERCIAL

## 2024-10-01 VITALS
DIASTOLIC BLOOD PRESSURE: 80 MMHG | WEIGHT: 126.2 LBS | HEIGHT: 68 IN | BODY MASS INDEX: 19.13 KG/M2 | SYSTOLIC BLOOD PRESSURE: 124 MMHG | OXYGEN SATURATION: 98 % | HEART RATE: 85 BPM

## 2024-10-01 DIAGNOSIS — I36.1 NONRHEUMATIC TRICUSPID VALVE REGURGITATION: ICD-10-CM

## 2024-10-01 DIAGNOSIS — I34.1 MVP (MITRAL VALVE PROLAPSE): Primary | ICD-10-CM

## 2024-10-01 DIAGNOSIS — R00.2 HEART PALPITATIONS: ICD-10-CM

## 2024-10-01 DIAGNOSIS — G47.33 OSA (OBSTRUCTIVE SLEEP APNEA): ICD-10-CM

## 2024-10-01 DIAGNOSIS — E78.2 MIXED HYPERLIPIDEMIA: ICD-10-CM

## 2024-10-01 PROCEDURE — 99214 OFFICE O/P EST MOD 30 MIN: CPT | Performed by: INTERNAL MEDICINE

## 2024-10-02 NOTE — PROGRESS NOTES
Nathalie Spring  1970  [id number]  [facility] [address] [id number] [phone]    1. MVP (mitral valve prolapse)  Echo complete w/ contrast if indicated      2. HENRRY (obstructive sleep apnea)        3. Mixed hyperlipidemia        4. Nonrheumatic tricuspid valve regurgitation  Echo complete w/ contrast if indicated      5. Heart palpitations            Discussion/Summary: Overall she has been doing well.  Vitamin B was on the low side fatigue has improved.  Needs a follow-up echocardiogram for moderate tricuspid regurgitation.  Blood pressure and lipids are doing well I will continue to see her yearly.      Interval History: 53-year-old female with a history of mitral valve prolapse, borderline hypertension, hyperlipidemia presents for new patient consult on behalf of Dr. Weiner.  Functional capacity has been good.  She gets occasional palpitations these are short-lived and occur once or twice a month.  She denies any significant change in her functional capacity.  She has small aches and pains sharp pains in her chest but has a history of fibromyalgia.  These do not sound anginal in nature.  Denies any lower extremity edema, PND, orthopnea.  She is a non-smoker.  No significant alcohol intake.  Moderate hydration.  Mild caffeine use.    Overall she is feeling well she has good functional capacity denies any chest pain, shortness of breath, palpitations, lightheadedness, dizziness, or syncope.  There is been a lower extremity edema, PND, orthopnea.  She has been taking all medications as prescribed.  Medical Problems       Problem List       Gastroesophageal reflux disease with esophagitis without hemorrhage    Irritable bowel syndrome with both constipation and diarrhea    Steatosis of liver    Mixed hyperlipidemia    MVP (mitral valve prolapse)    Heart palpitations    Other chest pain        Past Medical History:   Diagnosis Date     Bladder cancer (HCC)     at age of 30    Colon polyp     stomach polyps    Disease of thyroid gland     GERD (gastroesophageal reflux disease)     Hypertension     MVP (mitral valve prolapse)      Social History     Socioeconomic History    Marital status: /Civil Union     Spouse name: Not on file    Number of children: Not on file    Years of education: Not on file    Highest education level: Not on file   Occupational History    Not on file   Tobacco Use    Smoking status: Never    Smokeless tobacco: Never   Vaping Use    Vaping status: Never Used   Substance and Sexual Activity    Alcohol use: Yes     Comment: occasional    Drug use: Never    Sexual activity: Not on file   Other Topics Concern    Not on file   Social History Narrative    Not on file     Social Determinants of Health     Financial Resource Strain: Not on file   Food Insecurity: Not on file   Transportation Needs: Not on file   Physical Activity: Not on file   Stress: Not on file   Social Connections: Unknown (6/18/2024)    Received from [employer]     How often do you feel lonely or isolated from those around you? (Adult - for ages 18 years and over): Not on file   Intimate Partner Violence: Not on file   Housing Stability: Not on file      Family History   Problem Relation Age of Onset    Diabetes Mother     Cancer Mother     Diabetes Father     Hypertension Father     Hyperlipidemia Father     No Known Problems Sister     No Known Problems Maternal Grandmother     No Known Problems Paternal Grandmother     No Known Problems Sister     No Known Problems Maternal Aunt     Lymphoma Maternal Aunt     No Known Problems Paternal Aunt     No Known Problems Paternal Aunt     Breast cancer Neg Hx      Past Surgical History:   Procedure Laterality Date    BACK SURGERY      CHOLECYSTECTOMY      HYSTERECTOMY      TRANSURETHRAL RESECTION OF BLADDER         Current Outpatient Medications:     atorvastatin (LIPITOR) 10 mg tablet, ,  "Disp: , Rfl:     Cholecalciferol (VITAMIN D3) 125 MCG (5000 UT) CAPS, , Disp: , Rfl:     Cyanocobalamin 1000 MCG/ML KIT, Inject as directed Once monthly injection, Disp: , Rfl:     estradiol (VAGIFEM, YUVAFEM) 10 MCG TABS vaginal tablet, Insert into the vagina once a week , Disp: , Rfl:     FLUoxetine (PROzac) 20 mg capsule, Take 20 mg by mouth daily, Disp: , Rfl:     levothyroxine 25 mcg tablet, , Disp: , Rfl:     famotidine (PEPCID) 20 mg tablet, Take 1 PO HS. (Patient not taking: Reported on 10/1/2024), Disp: 90 tablet, Rfl: 1    metoprolol succinate (TOPROL-XL) 25 mg 24 hr tablet, Take 12.5 mg by mouth daily (Patient not taking: Reported on 10/1/2024), Disp: , Rfl:   No Known Allergies    Labs:     Chemistry        Component Value Date/Time    K 4.6 07/23/2024 0737    K 4.4 04/13/2018 0850     07/23/2024 0737     04/13/2018 0850    CO2 30 07/23/2024 0737    CO2 27 04/13/2018 0850    BUN 12 07/23/2024 0737    BUN 12 04/13/2018 0850    CREATININE 0.71 07/23/2024 0737    CREATININE 0.8 04/13/2018 0850        Component Value Date/Time    CALCIUM 10.2 07/23/2024 0737    CALCIUM 9.8 04/13/2018 0850    ALKPHOS 52 07/23/2024 0737    ALKPHOS 35 04/13/2018 0850    AST 14 07/23/2024 0737    AST 16 04/13/2018 0850    ALT 14 07/23/2024 0737    ALT 18 04/13/2018 0850            No results found for: \"CHOL\"  Lab Results   Component Value Date    HDL 37 (L) 03/26/2024    HDL 34 (L) 08/16/2023    HDL 42 (L) 08/02/2022     Lab Results   Component Value Date    LDLCALC 67 03/26/2024    LDLCALC  08/16/2023      Comment:      Calculated LDL invalid, triglycerides >400 mg/dl  This screening LDL is a calculated result.   It does not have the accuracy of the Direct Measured LDL in the monitoring of patients with hyperlipidemia and/or statin therapy.   Direct Measure LDL (KVS565) must be ordered separately in these patients.    LDLCALC 127 (H) 08/02/2022     Lab Results   Component Value Date    TRIG 91 03/26/2024    TRIG " "133 02/05/2024    TRIG 561 (H) 08/16/2023     No results found for: \"CHOLHDL\"    Imaging: Mammo screening bilateral w 3d & cad    Result Date: 2/7/2024  Narrative: DIAGNOSIS: Encounter for screening mammogram for malignant neoplasm of breast TECHNIQUE: Digital screening mammography was performed. Computer Aided Detection (CAD) analyzed all applicable images. COMPARISONS: Prior breast imaging dated: 12/12/2022, 12/12/2022, 12/06/2022, 07/29/2021, 07/29/2021, 07/12/2021, 07/12/2021, 07/12/2021, 10/21/2019, 03/23/2018, 03/23/2018, 03/16/2018, 03/16/2018, 11/30/2016, 11/30/2016, 12/02/2015, 12/02/2015, 12/24/2014, and 12/18/2014 RELEVANT HISTORY: Family Breast Cancer History: History of breast cancer in Neg Hx. Family Medical History: No known relevant family medical history. Personal History: Hormone history includes estrogen replacement therapy. Surgical history includes hysterectomy. No known relevant medical history. The patient is scheduled in a reminder system for screening mammography. 8-10% of cancers will be missed on mammography. Management of a palpable abnormality must be based on clinical grounds.  Patients will be notified of their results via letter from our facility. Accredited by [employer] and FDA. RISK ASSESSMENT: 5 Year Tyrer-Cuzick: 1.33% 10 Year Tyrer-Cuzick: 2.92% Lifetime Tyrer-Cuzick: 10.27% TISSUE DENSITY: The breasts are heterogeneously dense, which may obscure small masses. INDICATION: Nathalie Londono is a 53 y.o. female presenting for screening mammography. FINDINGS: Bilateral There are no suspicious masses, grouped microcalcifications or areas of unexplained architectural distortion. The skin and nipple areolar complex are unremarkable.  Left superior breast asymmetry is unchanged since 12/18/2014.  Left breast 11:00 oval mass is unchanged since at least 7/12/2021, previously characterized as a cyst on ultrasound 7/29/2021.     Impression: No mammographic evidence of " "malignancy. ASSESSMENT/BI-RADS CATEGORY: Left: 2 - Benign Right: 1 - Negative Overall: 2 - Benign RECOMMENDATION:      - Routine screening mammogram in 1 year for both breasts. Workstation ID: [id number]     XR spine cervical complete 6+ vw flex/ext/obl    Result Date: 2/6/2024  Narrative: CERVICAL SPINE INDICATION:   Z12.31: Encounter for screening mammogram for malignant neoplasm of breast M54.2: Cervicalgia. COMPARISON:  None VIEWS:  XR SPINE CERVICAL COMPLETE 6+ VW FLEX /EXT /OBL FINDINGS: No fracture. Normal alignment without subluxation. There is minimal degenerative disc disease at C5-6 with very mild disc space narrowing and marginal osteophyte formation. Minimal loss of disc height at C4-5. No evidence of dynamic instability seen with flexion or extension. The prevertebral soft tissues are within normal limits. The obliquity for the left neural foramen is limited, neuroforamen not well visualized. Right-sided foramen widely patent. The lung apices are clear.     Impression: Early degenerative disc disease as above. Workstation performed: [id number]       ECG:  nsr      Review of Systems   Constitutional: Negative.   HENT: Negative.     Eyes: Negative.    Cardiovascular: Negative.    Respiratory: Negative.     Endocrine: Negative.    Hematologic/Lymphatic: Negative.    Skin: Negative.    Musculoskeletal: Negative.    Gastrointestinal: Negative.    Genitourinary: Negative.    Neurological: Negative.    Psychiatric/Behavioral: Negative.     All other systems reviewed and are negative.      Vitals:    10/01/24 1529   BP: 124/80   Pulse: 85   SpO2: 98%     Vitals:    10/01/24 1529   Weight: 57.2 kg (126 lb 3.2 oz)     Height: 5' 8\" (172.7 cm)   Body mass index is 19.19 kg/m².    Physical Exam:  Vital signs reviewed  General:  Alert and cooperative, appears stated age, no acute distress  HEENT:  PERRLA, EOMI, no scleral icterus, no conjunctival pallor  Neck:  No lymphadenopathy, no thyromegaly, no carotid " bruits, no elevated JVP  Heart:  Regular rate and rhythm, normal S1/S2, no S3/S4, no murmur, rubs or gallops.  PMI nondisplaced  Lungs:  Clear to auscultation bilaterally, no wheezes rales or rhonchi  Abdomen:  Soft, non-tender, positive bowel sounds, no rebound or guarding,   no organomegaly   Extremities:  Normal range of motion.  No clubbing, cyanosis or edema   Vascular:  2+ pedal pulses  Skin:  No rashes or lesions on exposed skin  Neurologic:  Cranial nerves II-XII grossly intact without focal deficits  Psych:  Normal mood and affect

## 2024-10-29 ENCOUNTER — OFFICE VISIT (OUTPATIENT)
Dept: GASTROENTEROLOGY | Facility: CLINIC | Age: 54
End: 2024-10-29
Payer: COMMERCIAL

## 2024-10-29 VITALS
WEIGHT: 126 LBS | BODY MASS INDEX: 19.1 KG/M2 | TEMPERATURE: 98.3 F | HEART RATE: 90 BPM | OXYGEN SATURATION: 97 % | DIASTOLIC BLOOD PRESSURE: 71 MMHG | SYSTOLIC BLOOD PRESSURE: 106 MMHG | HEIGHT: 68 IN

## 2024-10-29 DIAGNOSIS — K21.00 GASTROESOPHAGEAL REFLUX DISEASE WITH ESOPHAGITIS WITHOUT HEMORRHAGE: ICD-10-CM

## 2024-10-29 DIAGNOSIS — K76.0 STEATOSIS OF LIVER: ICD-10-CM

## 2024-10-29 DIAGNOSIS — Z12.11 SCREENING FOR COLON CANCER: ICD-10-CM

## 2024-10-29 DIAGNOSIS — K58.2 IRRITABLE BOWEL SYNDROME WITH BOTH CONSTIPATION AND DIARRHEA: Primary | ICD-10-CM

## 2024-10-29 PROCEDURE — 99214 OFFICE O/P EST MOD 30 MIN: CPT | Performed by: NURSE PRACTITIONER

## 2024-10-29 NOTE — PROGRESS NOTES
Ambulatory Visit  Name: Nathalie Londono      : 1970      MRN: [id number]  Encounter Provider: NICKOLAS Barber  Encounter Date: 10/29/2024   Encounter department: [facility] [place]    Assessment & Plan  Irritable bowel syndrome with both constipation and diarrhea  Improved/Stable    Continue to drink at least 64 ounces of water daily.  Continue with current diet, weight loss, and lifestyle changes.       Gastroesophageal reflux disease with esophagitis without hemorrhage  Improved/Stable    Continue to use over-the-counter famotidine as needed for any reflux symptoms.  Encouraged the patient to avoid acidic or trigger foods including alcohol as much as possible.  Encouraged the patient to consider purchasing a wedge pillow to help prevent reflux symptoms while sleeping.  Encouraged the patient not to lay down or sleep for at least 3 to 4 hours after eating and to try to avoid late night snacking.         Screening for colon cancer  We will hold off on any repeat colonoscopies until the recommended surveillance date unless the patient would start to develop red flag symptoms in the future.  The patient was agreeable and verbalized an understanding.  DUE: 29    Reviewed GI red flag symptoms with patient today.         Steatosis of liver  During today's office visit, I reviewed the patient's previous blood work results from 2024 and since they were normal, we will proceed with repeat blood work in 2025.  We will contact the patient once we have the results of the blood work to review the results and discuss any other treatment plan recommendations.  The patient was agreeable and verbalized an understanding.    Last Liver Imaging: 3/14/24  Repeat Imaging Ordered: No    Continue to work on diet/weight loss.  Continue to eat lower fat, higher fiber foods on a more regular daily basis.   Continue to keep alcohol consumption to a minimum.   Continue to work on work on  adequate management of contributing health issues such as: high blood pressure, high cholesterol, diabetes, etc.     Orders:    CBC and differential; Future    Comprehensive metabolic panel; Future    Protime-INR; Future    Bilirubin, direct; Future    The patient is to schedule a follow up office visit in 6 months, but understands to call or contact our offices with any issues before then or as needed.     History of Present Illness     Nathalie Londono is a 54 y.o. female who presents today for a follow-up office visit regarding her irritable bowel syndrome with both constipation and diarrhea, GERD symptoms, and history of steatosis of the liver.  Since her last office visit the patient reports that she feels that her bowel movements and irritable bowel syndrome have actually improved and remained stable since we have been weaning her off meds, she has lost weight, and she has drastically changed her diet.    The patient reports that since her last office visit she has continued with her diet and weight loss goals with overall lifestyle and diet changes.  She stopped taking the famotidine as she is not having any further issues with GERD.    She does report that unfortunately she did have to go back on the antidepressant because her anxiety and depression issues were not manageable off of it, but is feeling better.  She has also recently started B12 injections which is helping her energy and fatigue.    The patient currently denies any reflux, nausea, dysphagia, vomiting, decreased appetite, unplanned weight loss, or abdominal pain. Water Intake: Adequate amounts per day.    The patient currently reports that they have a BM daily and reports that it is usually relieving, without any consistent diarrhea, nocturnal BMs, constipation, straining, melena or bloody stools. Poneto: 4. Last BM: Today. Flatus: Minimally, depends on what she eats.    Meds: None  Daily NSAID Use: Denied.    Imaging: (None):     Endoscopy  "History: EGD:  (2/8/23): Ten or more sessile fundic gland polyps measuring less than 5 mm. Path: Mild esophagitis     COLONOSCOPY: (7/2/19): Normal. Random biopsies obtained, normal.     DUE: 7/2/29    History obtained from : patient  Review of Systems        Objective     /71 (BP Location: Left arm, Patient Position: Sitting, Cuff Size: Standard)   Pulse 90   Temp 98.3 °F (36.8 °C) (Temporal)   Ht 5' 8\" (1.727 m)   Wt 57.2 kg (126 lb)   SpO2 97%   BMI 19.16 kg/m²     Physical Exam  Abdomen is soft, nondistended, nontender, with hypoactive bowel sounds x 4.  No edema noted of the b/l lower extremities upon exam today. Skin is non-icteric.         Answers submitted by the patient for this visit:  Abdominal Pain Questionnaire (Submitted on 10/29/2024)  Chief Complaint: Abdominal pain  Progression since onset: unchanged    "

## 2024-10-29 NOTE — PATIENT INSTRUCTIONS
Proceed with repeat blood work in January 2025.   Can use OTC famotidine as needed for any reflux symptoms.   Encouraged the patient to avoid acidic or trigger foods including alcohol as much as possible.  Encouraged the patient to consider purchasing a wedge pillow to help prevent reflux symptoms while sleeping.  Encouraged the patient not to lay down or sleep for at least 3 to 4 hours after eating and to try to avoid late night snacking.  Continue to drink at least 64 oz of water daily.   Discussed recommendations in regards to fatty liver including:   Strict control of contributing comorbidities (obesity, prediabetes/diabetes, hypertension, and hypertriglyceridemia).  Weight loss of approx 10-15% of patient's current body weight over a period of 6-12 months through low fat diet and cardiovascular exercise as tolerated.   Limiting alcohol consumption, preferably complete abstinence.  Monitor hepatic function every 6 months with routine labs.   Continue to watch for red flag symptoms.   Schedule a f/u OV in 6 months.     We did review GI red flag symptoms, including, but not limited to: chronic nausea, vomiting, diarrhea, chills, fever, and unintentional weight loss and should call or contact our office with any changes or concerns. I reviewed with the patient that if they notice any blood while vomiting or in their stool they should contact or office or go to the nearest emergency room for immediate evaluation. The patient was agreeable and verbalized an understanding.    
14.8

## 2024-10-29 NOTE — ASSESSMENT & PLAN NOTE
Improved/Stable    Continue to drink at least 64 ounces of water daily.  Continue with current diet, weight loss, and lifestyle changes.

## 2024-10-29 NOTE — ASSESSMENT & PLAN NOTE
During today's office visit, I reviewed the patient's previous blood work results from July 2024 and since they were normal, we will proceed with repeat blood work in Jan 2025.  We will contact the patient once we have the results of the blood work to review the results and discuss any other treatment plan recommendations.  The patient was agreeable and verbalized an understanding.    Last Liver Imaging: 3/14/24  Repeat Imaging Ordered: No    Continue to work on diet/weight loss.  Continue to eat lower fat, higher fiber foods on a more regular daily basis.   Continue to keep alcohol consumption to a minimum.   Continue to work on work on adequate management of contributing health issues such as: high blood pressure, high cholesterol, diabetes, etc.     Orders:    CBC and differential; Future    Comprehensive metabolic panel; Future    Protime-INR; Future    Bilirubin, direct; Future

## 2024-10-29 NOTE — ASSESSMENT & PLAN NOTE
Improved/Stable    Continue to use over-the-counter famotidine as needed for any reflux symptoms.  Encouraged the patient to avoid acidic or trigger foods including alcohol as much as possible.  Encouraged the patient to consider purchasing a wedge pillow to help prevent reflux symptoms while sleeping.  Encouraged the patient not to lay down or sleep for at least 3 to 4 hours after eating and to try to avoid late night snacking.

## 2024-11-11 ENCOUNTER — OFFICE VISIT (OUTPATIENT)
Dept: URGENT CARE | Facility: CLINIC | Age: 54
End: 2024-11-11
Payer: COMMERCIAL

## 2024-11-11 VITALS
HEART RATE: 79 BPM | WEIGHT: 125 LBS | SYSTOLIC BLOOD PRESSURE: 117 MMHG | RESPIRATION RATE: 18 BRPM | BODY MASS INDEX: 18.94 KG/M2 | TEMPERATURE: 97.6 F | DIASTOLIC BLOOD PRESSURE: 78 MMHG | OXYGEN SATURATION: 100 % | HEIGHT: 68 IN

## 2024-11-11 DIAGNOSIS — R35.0 URINARY FREQUENCY: Primary | ICD-10-CM

## 2024-11-11 LAB
SL AMB  POCT GLUCOSE, UA: ABNORMAL
SL AMB LEUKOCYTE ESTERASE,UA: ABNORMAL
SL AMB POCT BILIRUBIN,UA: ABNORMAL
SL AMB POCT BLOOD,UA: ABNORMAL
SL AMB POCT CLARITY,UA: CLEAR
SL AMB POCT COLOR,UA: YELLOW
SL AMB POCT KETONES,UA: ABNORMAL
SL AMB POCT NITRITE,UA: ABNORMAL
SL AMB POCT PH,UA: 7.5
SL AMB POCT SPECIFIC GRAVITY,UA: 1
SL AMB POCT URINE PROTEIN: ABNORMAL
SL AMB POCT UROBILINOGEN: 0.2

## 2024-11-11 PROCEDURE — 87086 URINE CULTURE/COLONY COUNT: CPT

## 2024-11-11 PROCEDURE — 81002 URINALYSIS NONAUTO W/O SCOPE: CPT

## 2024-11-11 PROCEDURE — 99213 OFFICE O/P EST LOW 20 MIN: CPT

## 2024-11-11 RX ORDER — NITROFURANTOIN 25; 75 MG/1; MG/1
100 CAPSULE ORAL 2 TIMES DAILY
Qty: 10 CAPSULE | Refills: 0 | Status: SHIPPED | OUTPATIENT
Start: 2024-11-11 | End: 2024-11-16

## 2024-11-11 NOTE — PROGRESS NOTES
[facility]        NAME: Nathalie Londono is a 54 y.o. female  : 1970    MRN: [id number]  DATE: 2024  TIME: 7:05 PM    Assessment and Plan   Urinary frequency [R35.0]  1. Urinary frequency  POCT urine dip    Urine culture    nitrofurantoin (MACROBID) 100 mg capsule        Urinary frequency starting over the last few days.  Denies any discomfort with urination, hematuria, flank pain, suprapubic pain.  Urine dip showing small leukocytes.  Negative nitrite, blood, protein, glucose.  Will send for urine culture.  Continue Macrobid for to pharmacy to hold off until urine culture returns.  Recommended family doctor/urology follow-up if no improvement.  Advised to go to the emergency room if any symptoms worsen.    Patient Instructions     You may hold off on taking antibiotic (Macrobid) until urine culture returns.  Make sure to stay well-hydrated.  Over-the-counter cranberry tablet 500 mg daily.  May try Azo over-the-counter if any discomfort/pain occurs with urination.  May turn your urine orange/red.  If no improvement, follow-up with family doctor/urology.  Follow up with PCP in 3-5 days.  Proceed to  ER if symptoms worsen.    If tests are performed, our office will contact you with results only if changes need to made to the care plan discussed with you at the visit. You can [facility] your full [facility] on [facility].    Chief Complaint     Chief Complaint   Patient presents with    Possible UTI     Been having frequency of urination been going on for a couple of days she states.          History of Present Illness       54-year-old female with history of IBS, hypertension, hypothyroidism, MVP, mixed incontinence/midline cystocele presents to the clinic for complaints of frequent urination going on for the last few days.  Mitts to frequent UTIs in her 20s.  Patient states that she did get diagnosed with a bladder cancer in her 30s, however, did get second opinion and surveillance was  performed without any issues ( per PT).  Did not take anything over-the-counter.  Denies history of diabetes.  Denies polydipsia/polyphagia.        Review of Systems   Review of Systems   Constitutional: Negative.    Respiratory: Negative.     Cardiovascular: Negative.    Gastrointestinal: Negative.    Genitourinary:  Positive for frequency. Negative for flank pain, hematuria and pelvic pain.         Current Medications       Current Outpatient Medications:     atorvastatin (LIPITOR) 10 mg tablet, , Disp: , Rfl:     Cholecalciferol (VITAMIN D3) 125 MCG (5000 UT) CAPS, , Disp: , Rfl:     Cyanocobalamin 1000 MCG/ML KIT, Inject as directed Once monthly injection, Disp: , Rfl:     Cyanocobalamin 1000 MCG/ML KIT, Inject 1,000 mcg into a muscle every 30 (thirty) days, Disp: , Rfl:     estradiol (VAGIFEM, YUVAFEM) 10 MCG TABS vaginal tablet, Insert into the vagina once a week , Disp: , Rfl:     FLUoxetine (PROzac) 20 mg capsule, Take 20 mg by mouth daily, Disp: , Rfl:     levothyroxine 25 mcg tablet, , Disp: , Rfl:     nitrofurantoin (MACROBID) 100 mg capsule, Take 1 capsule (100 mg total) by mouth 2 (two) times a day for 5 days, Disp: 10 capsule, Rfl: 0    Current Allergies     Allergies as of 11/11/2024    (No Known Allergies)            The following portions of the patient's history were reviewed and updated as appropriate: allergies, current medications, past family history, past medical history, past social history, past surgical history and problem list.     Past Medical History:   Diagnosis Date    Bladder cancer (HCC)     at age of 30    Colon polyp     stomach polyps    Coronary artery disease     Mitral Valve Prolapse    Disease of thyroid gland     Diverticulitis of colon     GERD (gastroesophageal reflux disease)     Hyperlipidemia     Hypertension     Irritable bowel syndrome     Lactose intolerance 12/2012    MVP (mitral valve prolapse)        Past Surgical History:   Procedure Laterality Date    ABDOMINAL  "SURGERY      Endometriosis/Bladder Resection    BACK SURGERY      CHOLECYSTECTOMY      COLONOSCOPY  12/2012    Sigmoid Diverticulosis    FLEXIBLE SIGMOIDOSCOPY  10/02/2010    HYSTERECTOMY      TRANSURETHRAL RESECTION OF BLADDER      UPPER GASTROINTESTINAL ENDOSCOPY  12/2012    BileReflux, Gastritis,Gastric Polyps       Family History   Problem Relation Age of Onset    Diabetes Mother     Cancer Mother         Small Cell Lung    Hearing loss Mother     Hyperlipidemia Mother     Hypertension Mother     Stroke Mother     Diabetes Father     Hypertension Father     Hyperlipidemia Father     Hearing loss Father     Heart disease Father         MVP    Hypothyroidism Father     No Known Problems Sister     No Known Problems Maternal Grandmother     No Known Problems Paternal Grandmother     No Known Problems Sister     No Known Problems Maternal Aunt     Lymphoma Maternal Aunt     No Known Problems Paternal Aunt     No Known Problems Paternal Aunt     Hyperlipidemia Brother     Hyperlipidemia Brother     Learning disabilities Brother     Breast cancer Neg Hx          Medications have been verified.        Objective   /78 (BP Location: Left arm, Patient Position: Sitting, Cuff Size: Large)   Pulse 79   Temp 97.6 °F (36.4 °C)   Resp 18   Ht 5' 8\" (1.727 m)   Wt 56.7 kg (125 lb)   SpO2 100%   BMI 19.01 kg/m²        Physical Exam     Physical Exam  Constitutional:       General: She is not in acute distress.     Appearance: Normal appearance. She is not ill-appearing or diaphoretic.   HENT:      Head: Normocephalic and atraumatic.      Nose: No congestion.      Mouth/Throat:      Mouth: Mucous membranes are moist.      Pharynx: Oropharynx is clear.   Cardiovascular:      Rate and Rhythm: Normal rate and regular rhythm.      Pulses: Normal pulses.      Heart sounds: Normal heart sounds.   Pulmonary:      Effort: Pulmonary effort is normal. No respiratory distress.      Breath sounds: Normal breath sounds. "   Abdominal:      General: Abdomen is flat.      Palpations: Abdomen is soft.      Tenderness: There is no abdominal tenderness. There is no right CVA tenderness, left CVA tenderness, guarding or rebound.   Skin:     General: Skin is warm and dry.      Capillary Refill: Capillary refill takes less than 2 seconds.   Neurological:      General: No focal deficit present.      Mental Status: She is alert.   Psychiatric:         Mood and Affect: Mood normal.         Behavior: Behavior normal.

## 2024-11-12 ENCOUNTER — TELEPHONE (OUTPATIENT)
Dept: UROLOGY | Facility: CLINIC | Age: 54
End: 2024-11-12

## 2024-11-12 LAB — BACTERIA UR CULT: NORMAL

## 2024-11-12 NOTE — TELEPHONE ENCOUNTER
Patient returned call to the office and informed her of new appointment time. Added patient to the waitlist.

## 2024-11-12 NOTE — PATIENT INSTRUCTIONS
"You may hold off on taking antibiotic (Macrobid) until urine culture returns.  Make sure to stay well-hydrated.  Over-the-counter cranberry tablet 500 mg daily.  May try Azo over-the-counter if any discomfort/pain occurs with urination.  May turn your urine orange/red.  If no improvement, follow-up with family doctor/urology.  Follow up with PCP in 3-5 days.  Proceed to  ER if symptoms worsen.    If tests are performed, our office will contact you with results only if changes need to made to the care plan discussed with you at the visit. You can review your full results on [facility].  Patient Education     Urinary tract infection - Discharge instructions   The Basics   Written by the doctors and editors at [facility]   What are discharge instructions? -- Discharge instructions are information about how to take care of yourself after getting medical care for a health problem.  What is a urinary tract infection? -- A urinary tract infection (\"UTI\") is an infection that affects either the bladder or the kidneys (figure 1). A kidney infection is more serious, and can lead to other serious problems if it is not treated properly.  You need to take antibiotics to treat a UTI. It is important to take all of your antibiotics, even if you start to feel better.  How do I care for myself at home? -- Ask the doctor or nurse what you should do when you go home. Make sure that you understand exactly what you need to do to care for yourself. Ask questions if there is anything you do not understand.  You should also:   Take all of your medicines as instructed.   Take phenazopyridine (sample brand name: AZO Urinary Pain Relief) for the first day or so, if you choose. This is an over-the-counter medicine. It will help numb your bladder and decrease the urge to urinate. This medicine causes your urine and tears to look orange.   Take acetaminophen (sample brand name: Tylenol) if needed for pain.   Drink extra fluids. This can " help prevent more bladder infections. If you have sex, these things might also help:   Urinate right afterward.   If you use birth control, use a form that does not contain spermicide.  When should I call the doctor? -- Call for advice if:   You have pain in your back, shoulder, or belly.   You have a fever, shaking chills, or sweats even though you are taking antibiotics.   You notice more blood in your urine.   Your symptoms get worse or do not get better within 24 hours of starting antibiotics.   Your symptoms come back after finishing treatment.   You have any new or worrying symptoms.  All topics are updated as new evidence becomes available and our peer review process is complete.  This topic retrieved from [employer] on: Feb 26, 2024.  Topic 166280 Version 1.0  Release: 32.2.4 - C32.56  © 2024 UpToDate, Inc. and/or its affiliates. All rights reserved.  figure 1: Anatomy of the urinary tract     Urine is made by the kidneys. It passes from the kidneys into the bladder through 2 tubes called the ureters. Then, it leaves the bladder through another tube called the urethra.  Graphic 87685 Version 8.0  Consumer Information Use and Disclaimer   Disclaimer: This generalized information is a limited summary of diagnosis, treatment, and/or medication information. It is not meant to be comprehensive and should be used as a tool to help the user understand and/or assess potential diagnostic and treatment options. It does NOT include all information about conditions, treatments, medications, side effects, or risks that may apply to a specific patient. It is not intended to be medical advice or a substitute for the medical advice, diagnosis, or treatment of a health care provider based on the health care provider's examination and assessment of a patient's specific and unique circumstances. Patients must speak with a health care provider for complete information about their health, medical questions, and treatment options,  including any risks or benefits regarding use of medications. This information does not endorse any treatments or medications as safe, effective, or approved for treating a specific patient. UpToDate, Inc. and its affiliates disclaim any warranty or liability relating to this information or the use thereof.The use of this information is governed by the Terms of Use, available at https://www.pluriSelect.com/en/know/clinical-effectiveness-terms. 2024© UpToDate, Inc. and its affiliates and/or licensors. All rights reserved.  Copyright   © 2024 UpToDate, Inc. and/or its affiliates. All rights reserved.

## 2024-11-13 ENCOUNTER — RESULTS FOLLOW-UP (OUTPATIENT)
Dept: URGENT CARE | Facility: CLINIC | Age: 54
End: 2024-11-13

## 2024-12-21 ENCOUNTER — APPOINTMENT (OUTPATIENT)
Dept: LAB | Facility: CLINIC | Age: 54
End: 2024-12-21
Payer: COMMERCIAL

## 2024-12-21 ENCOUNTER — APPOINTMENT (OUTPATIENT)
Dept: RADIOLOGY | Facility: CLINIC | Age: 54
End: 2024-12-21
Payer: COMMERCIAL

## 2024-12-21 DIAGNOSIS — Z11.59 NEED FOR HEPATITIS C SCREENING TEST: ICD-10-CM

## 2024-12-21 DIAGNOSIS — Z11.4 SCREENING FOR HUMAN IMMUNODEFICIENCY VIRUS: ICD-10-CM

## 2024-12-21 DIAGNOSIS — R63.4 WEIGHT LOSS: ICD-10-CM

## 2024-12-21 LAB
ALBUMIN SERPL BCG-MCNC: 4.8 G/DL (ref 3.5–5)
ALP SERPL-CCNC: 60 U/L (ref 34–104)
ALT SERPL W P-5'-P-CCNC: 21 U/L (ref 7–52)
ANION GAP SERPL CALCULATED.3IONS-SCNC: 11 MMOL/L (ref 4–13)
AST SERPL W P-5'-P-CCNC: 20 U/L (ref 13–39)
BASOPHILS # BLD AUTO: 0.03 THOUSANDS/ÂΜL (ref 0–0.1)
BASOPHILS NFR BLD AUTO: 1 % (ref 0–1)
BILIRUB SERPL-MCNC: 0.71 MG/DL (ref 0.2–1)
BUN SERPL-MCNC: 12 MG/DL (ref 5–25)
CALCIUM SERPL-MCNC: 10.4 MG/DL (ref 8.4–10.2)
CHLORIDE SERPL-SCNC: 102 MMOL/L (ref 96–108)
CO2 SERPL-SCNC: 28 MMOL/L (ref 21–32)
CREAT SERPL-MCNC: 0.58 MG/DL (ref 0.6–1.3)
CRP SERPL QL: <1 MG/L
EOSINOPHIL # BLD AUTO: 0.16 THOUSAND/ÂΜL (ref 0–0.61)
EOSINOPHIL NFR BLD AUTO: 4 % (ref 0–6)
ERYTHROCYTE [DISTWIDTH] IN BLOOD BY AUTOMATED COUNT: 12.2 % (ref 11.6–15.1)
ERYTHROCYTE [SEDIMENTATION RATE] IN BLOOD: 11 MM/HOUR (ref 0–29)
EST. AVERAGE GLUCOSE BLD GHB EST-MCNC: 108 MG/DL
GFR SERPL CREATININE-BSD FRML MDRD: 104 ML/MIN/1.73SQ M
GLUCOSE P FAST SERPL-MCNC: 81 MG/DL (ref 65–99)
HBA1C MFR BLD: 5.4 %
HCT VFR BLD AUTO: 43.2 % (ref 34.8–46.1)
HGB BLD-MCNC: 14.5 G/DL (ref 11.5–15.4)
IMM GRANULOCYTES # BLD AUTO: 0.01 THOUSAND/UL (ref 0–0.2)
IMM GRANULOCYTES NFR BLD AUTO: 0 % (ref 0–2)
LYMPHOCYTES # BLD AUTO: 2.05 THOUSANDS/ÂΜL (ref 0.6–4.47)
LYMPHOCYTES NFR BLD AUTO: 46 % (ref 14–44)
MCH RBC QN AUTO: 33 PG (ref 26.8–34.3)
MCHC RBC AUTO-ENTMCNC: 33.6 G/DL (ref 31.4–37.4)
MCV RBC AUTO: 98 FL (ref 82–98)
MONOCYTES # BLD AUTO: 0.26 THOUSAND/ÂΜL (ref 0.17–1.22)
MONOCYTES NFR BLD AUTO: 6 % (ref 4–12)
NEUTROPHILS # BLD AUTO: 1.92 THOUSANDS/ÂΜL (ref 1.85–7.62)
NEUTS SEG NFR BLD AUTO: 43 % (ref 43–75)
NRBC BLD AUTO-RTO: 0 /100 WBCS
PLATELET # BLD AUTO: 209 THOUSANDS/UL (ref 149–390)
PMV BLD AUTO: 10.4 FL (ref 8.9–12.7)
POTASSIUM SERPL-SCNC: 3.9 MMOL/L (ref 3.5–5.3)
PROT SERPL-MCNC: 7.6 G/DL (ref 6.4–8.4)
RBC # BLD AUTO: 4.4 MILLION/UL (ref 3.81–5.12)
SODIUM SERPL-SCNC: 141 MMOL/L (ref 135–147)
TSH SERPL DL<=0.05 MIU/L-ACNC: 2.34 UIU/ML (ref 0.45–4.5)
WBC # BLD AUTO: 4.43 THOUSAND/UL (ref 4.31–10.16)

## 2024-12-21 PROCEDURE — 85025 COMPLETE CBC W/AUTO DIFF WBC: CPT

## 2024-12-21 PROCEDURE — 86140 C-REACTIVE PROTEIN: CPT

## 2024-12-21 PROCEDURE — 85652 RBC SED RATE AUTOMATED: CPT

## 2024-12-21 PROCEDURE — 36415 COLL VENOUS BLD VENIPUNCTURE: CPT

## 2024-12-21 PROCEDURE — 71046 X-RAY EXAM CHEST 2 VIEWS: CPT

## 2024-12-21 PROCEDURE — 86803 HEPATITIS C AB TEST: CPT

## 2024-12-21 PROCEDURE — 80053 COMPREHEN METABOLIC PANEL: CPT

## 2024-12-21 PROCEDURE — 87389 HIV-1 AG W/HIV-1&-2 AB AG IA: CPT

## 2024-12-21 PROCEDURE — 83036 HEMOGLOBIN GLYCOSYLATED A1C: CPT

## 2024-12-21 PROCEDURE — 88112 CYTOPATH CELL ENHANCE TECH: CPT | Performed by: PATHOLOGY

## 2024-12-21 PROCEDURE — 84443 ASSAY THYROID STIM HORMONE: CPT

## 2024-12-22 LAB
HIV 1+2 AB+HIV1 P24 AG SERPL QL IA: NORMAL
HIV 2 AB SERPL QL IA: NORMAL
HIV1 AB SERPL QL IA: NORMAL
HIV1 P24 AG SERPL QL IA: NORMAL

## 2024-12-23 LAB — HCV AB SER QL: NORMAL

## 2024-12-26 PROCEDURE — 88112 CYTOPATH CELL ENHANCE TECH: CPT | Performed by: PATHOLOGY

## 2024-12-27 ENCOUNTER — APPOINTMENT (OUTPATIENT)
Dept: LAB | Facility: HOSPITAL | Age: 54
End: 2024-12-27

## 2024-12-27 DIAGNOSIS — R63.4 WEIGHT LOSS: ICD-10-CM

## 2025-01-09 ENCOUNTER — APPOINTMENT (OUTPATIENT)
Dept: LAB | Facility: HOSPITAL | Age: 55
End: 2025-01-09
Payer: COMMERCIAL

## 2025-01-09 DIAGNOSIS — R63.4 WEIGHT LOSS: ICD-10-CM

## 2025-01-09 LAB — HEMOCCULT STL QL IA: NEGATIVE

## 2025-01-09 PROCEDURE — G0328 FECAL BLOOD SCRN IMMUNOASSAY: HCPCS

## 2025-01-13 ENCOUNTER — HOSPITAL ENCOUNTER (OUTPATIENT)
Dept: ULTRASOUND IMAGING | Facility: HOSPITAL | Age: 55
Discharge: HOME | End: 2025-01-13
Payer: COMMERCIAL

## 2025-01-13 DIAGNOSIS — R82.998 OTHER ABNORMAL FINDINGS IN URINE: ICD-10-CM

## 2025-01-13 DIAGNOSIS — Z85.51 PERSONAL HISTORY OF MALIGNANT NEOPLASM OF BLADDER: ICD-10-CM

## 2025-01-13 PROCEDURE — 76775 US EXAM ABDO BACK WALL LIM: CPT

## 2025-02-12 ENCOUNTER — HOSPITAL ENCOUNTER (OUTPATIENT)
Dept: MAMMOGRAPHY | Facility: HOSPITAL | Age: 55
Discharge: HOME | End: 2025-02-12
Payer: COMMERCIAL

## 2025-02-12 ENCOUNTER — HOSPITAL ENCOUNTER (OUTPATIENT)
Dept: BONE DENSITY | Facility: HOSPITAL | Age: 55
Discharge: HOME | End: 2025-02-12
Payer: COMMERCIAL

## 2025-02-12 VITALS — WEIGHT: 122 LBS | HEIGHT: 67 IN | BODY MASS INDEX: 19.15 KG/M2

## 2025-02-12 DIAGNOSIS — Z78.0 ASYMPTOMATIC MENOPAUSAL STATE: ICD-10-CM

## 2025-02-12 DIAGNOSIS — Z12.31 ENCOUNTER FOR SCREENING MAMMOGRAM FOR MALIGNANT NEOPLASM OF BREAST: ICD-10-CM

## 2025-02-12 DIAGNOSIS — Z13.820 ENCOUNTER FOR SCREENING FOR OSTEOPOROSIS: ICD-10-CM

## 2025-02-12 PROCEDURE — 77063 BREAST TOMOSYNTHESIS BI: CPT

## 2025-02-12 PROCEDURE — 77080 DXA BONE DENSITY AXIAL: CPT

## 2025-02-12 PROCEDURE — 77067 SCR MAMMO BI INCL CAD: CPT

## 2025-03-17 ENCOUNTER — HOSPITAL ENCOUNTER (OUTPATIENT)
Dept: NON INVASIVE DIAGNOSTICS | Facility: HOSPITAL | Age: 55
Discharge: HOME | End: 2025-03-17
Payer: COMMERCIAL

## 2025-03-17 VITALS
HEIGHT: 67 IN | SYSTOLIC BLOOD PRESSURE: 114 MMHG | DIASTOLIC BLOOD PRESSURE: 62 MMHG | WEIGHT: 121.91 LBS | HEART RATE: 72 BPM | BODY MASS INDEX: 19.13 KG/M2

## 2025-03-17 DIAGNOSIS — I34.1 MVP (MITRAL VALVE PROLAPSE): ICD-10-CM

## 2025-03-17 DIAGNOSIS — I36.1 NONRHEUMATIC TRICUSPID VALVE REGURGITATION: ICD-10-CM

## 2025-03-17 LAB
AORTIC ROOT: 3.2 CM
ASCENDING AORTA: 2.5 CM
AV REGURGITATION PRESSURE HALF TIME: 489 MS
BSA FOR ECHO PROCEDURE: 1.63 M2
E WAVE DECELERATION TIME: 243 MS
E/A RATIO: 1.22
FRACTIONAL SHORTENING: 31 (ref 28–44)
INTERVENTRICULAR SEPTUM IN DIASTOLE (PARASTERNAL SHORT AXIS VIEW): 0.9 CM
INTERVENTRICULAR SEPTUM: 0.9 CM (ref 0.6–1.1)
LAAS-AP2: 16.9 CM2
LAAS-AP4: 18.6 CM2
LEFT ATRIUM SIZE: 3.7 CM
LEFT ATRIUM VOLUME (MOD BIPLANE): 56 ML
LEFT ATRIUM VOLUME INDEX (MOD BIPLANE): 34.4 ML/M2
LEFT INTERNAL DIMENSION IN SYSTOLE: 2.9 CM (ref 2.1–4)
LEFT VENTRICULAR INTERNAL DIMENSION IN DIASTOLE: 4.2 CM (ref 3.5–6)
LEFT VENTRICULAR POSTERIOR WALL IN END DIASTOLE: 1 CM
LEFT VENTRICULAR STROKE VOLUME: 46 ML
LV EF US.2D.A4C+ESTIMATED: 60 %
LVSV (TEICH): 46 ML
MV E'TISSUE VEL-LAT: 14 CM/S
MV E'TISSUE VEL-SEP: 11 CM/S
MV PEAK A VEL: 0.55 M/S
MV PEAK E VEL: 67 CM/S
MV STENOSIS PRESSURE HALF TIME: 71 MS
MV VALVE AREA P 1/2 METHOD: 3.1
RA PRESSURE ESTIMATED: 8 MMHG
RIGHT ATRIUM AREA SYSTOLE A4C: 17.3 CM2
RIGHT VENTRICLE ID DIMENSION: 3.1 CM
RV PSP: 30 MMHG
SL CV AV DECELERATION TIME RETROGRADE: 1687 MS
SL CV AV PEAK GRADIENT RETROGRADE: 78 MMHG
SL CV LEFT ATRIUM LENGTH A2C: 4.5 CM
SL CV LV EF: 60
SL CV PED ECHO LEFT VENTRICLE DIASTOLIC VOLUME (MOD BIPLANE) 2D: 77 ML
SL CV PED ECHO LEFT VENTRICLE SYSTOLIC VOLUME (MOD BIPLANE) 2D: 31 ML
TR MAX PG: 22 MMHG
TR PEAK VELOCITY: 2.4 M/S
TRICUSPID ANNULAR PLANE SYSTOLIC EXCURSION: 2.7 CM
TRICUSPID VALVE PEAK REGURGITATION VELOCITY: 2.35 M/S

## 2025-03-17 PROCEDURE — 93306 TTE W/DOPPLER COMPLETE: CPT

## 2025-03-17 PROCEDURE — 93306 TTE W/DOPPLER COMPLETE: CPT | Performed by: INTERNAL MEDICINE

## 2025-03-18 ENCOUNTER — APPOINTMENT (OUTPATIENT)
Dept: LAB | Facility: CLINIC | Age: 55
End: 2025-03-18
Payer: COMMERCIAL

## 2025-03-18 DIAGNOSIS — Z00.8 OTHER SPECIFIED GENERAL MEDICAL EXAMINATION: ICD-10-CM

## 2025-03-18 DIAGNOSIS — K76.0 STEATOSIS OF LIVER: ICD-10-CM

## 2025-03-18 LAB
ALBUMIN SERPL BCG-MCNC: 4.5 G/DL (ref 3.5–5)
ALP SERPL-CCNC: 60 U/L (ref 34–104)
ALT SERPL W P-5'-P-CCNC: 17 U/L (ref 7–52)
ANION GAP SERPL CALCULATED.3IONS-SCNC: 10 MMOL/L (ref 4–13)
AST SERPL W P-5'-P-CCNC: 16 U/L (ref 13–39)
BASOPHILS # BLD AUTO: 0.05 THOUSANDS/ÂΜL (ref 0–0.1)
BASOPHILS NFR BLD AUTO: 1 % (ref 0–1)
BILIRUB DIRECT SERPL-MCNC: 0.17 MG/DL (ref 0–0.2)
BILIRUB SERPL-MCNC: 0.68 MG/DL (ref 0.2–1)
BUN SERPL-MCNC: 10 MG/DL (ref 5–25)
CALCIUM SERPL-MCNC: 9.9 MG/DL (ref 8.4–10.2)
CHLORIDE SERPL-SCNC: 105 MMOL/L (ref 96–108)
CHOLEST SERPL-MCNC: 169 MG/DL (ref ?–200)
CO2 SERPL-SCNC: 29 MMOL/L (ref 21–32)
CREAT SERPL-MCNC: 0.61 MG/DL (ref 0.6–1.3)
EOSINOPHIL # BLD AUTO: 0.18 THOUSAND/ÂΜL (ref 0–0.61)
EOSINOPHIL NFR BLD AUTO: 4 % (ref 0–6)
ERYTHROCYTE [DISTWIDTH] IN BLOOD BY AUTOMATED COUNT: 12.3 % (ref 11.6–15.1)
EST. AVERAGE GLUCOSE BLD GHB EST-MCNC: 105 MG/DL
GFR SERPL CREATININE-BSD FRML MDRD: 102 ML/MIN/1.73SQ M
GLUCOSE P FAST SERPL-MCNC: 84 MG/DL (ref 65–99)
HBA1C MFR BLD: 5.3 %
HCT VFR BLD AUTO: 42.9 % (ref 34.8–46.1)
HDLC SERPL-MCNC: 60 MG/DL
HGB BLD-MCNC: 14.4 G/DL (ref 11.5–15.4)
IMM GRANULOCYTES # BLD AUTO: 0.01 THOUSAND/UL (ref 0–0.2)
IMM GRANULOCYTES NFR BLD AUTO: 0 % (ref 0–2)
INR PPP: 1 (ref 0.85–1.19)
LDLC SERPL CALC-MCNC: 91 MG/DL (ref 0–100)
LYMPHOCYTES # BLD AUTO: 2.16 THOUSANDS/ÂΜL (ref 0.6–4.47)
LYMPHOCYTES NFR BLD AUTO: 48 % (ref 14–44)
MCH RBC QN AUTO: 32.4 PG (ref 26.8–34.3)
MCHC RBC AUTO-ENTMCNC: 33.6 G/DL (ref 31.4–37.4)
MCV RBC AUTO: 96 FL (ref 82–98)
MONOCYTES # BLD AUTO: 0.28 THOUSAND/ÂΜL (ref 0.17–1.22)
MONOCYTES NFR BLD AUTO: 6 % (ref 4–12)
NEUTROPHILS # BLD AUTO: 1.86 THOUSANDS/ÂΜL (ref 1.85–7.62)
NEUTS SEG NFR BLD AUTO: 41 % (ref 43–75)
NONHDLC SERPL-MCNC: 109 MG/DL
NRBC BLD AUTO-RTO: 0 /100 WBCS
PLATELET # BLD AUTO: 184 THOUSANDS/UL (ref 149–390)
PMV BLD AUTO: 10.4 FL (ref 8.9–12.7)
POTASSIUM SERPL-SCNC: 4.1 MMOL/L (ref 3.5–5.3)
PROT SERPL-MCNC: 6.9 G/DL (ref 6.4–8.4)
PROTHROMBIN TIME: 13.5 SECONDS (ref 12.3–15)
RBC # BLD AUTO: 4.45 MILLION/UL (ref 3.81–5.12)
SODIUM SERPL-SCNC: 144 MMOL/L (ref 135–147)
TRIGL SERPL-MCNC: 88 MG/DL (ref ?–150)
WBC # BLD AUTO: 4.54 THOUSAND/UL (ref 4.31–10.16)

## 2025-03-18 PROCEDURE — 80053 COMPREHEN METABOLIC PANEL: CPT

## 2025-03-18 PROCEDURE — 82248 BILIRUBIN DIRECT: CPT

## 2025-03-18 PROCEDURE — 85025 COMPLETE CBC W/AUTO DIFF WBC: CPT

## 2025-03-18 PROCEDURE — 85610 PROTHROMBIN TIME: CPT

## 2025-03-18 PROCEDURE — 83036 HEMOGLOBIN GLYCOSYLATED A1C: CPT

## 2025-03-18 PROCEDURE — 36415 COLL VENOUS BLD VENIPUNCTURE: CPT

## 2025-03-18 PROCEDURE — 80061 LIPID PANEL: CPT

## 2025-07-17 ENCOUNTER — OFFICE VISIT (OUTPATIENT)
Age: 55
End: 2025-07-17
Payer: COMMERCIAL

## 2025-07-17 VITALS
DIASTOLIC BLOOD PRESSURE: 82 MMHG | OXYGEN SATURATION: 97 % | HEIGHT: 67 IN | TEMPERATURE: 98.8 F | BODY MASS INDEX: 20.59 KG/M2 | HEART RATE: 80 BPM | SYSTOLIC BLOOD PRESSURE: 124 MMHG | WEIGHT: 131.2 LBS

## 2025-07-17 DIAGNOSIS — K76.0 STEATOSIS OF LIVER: Primary | ICD-10-CM

## 2025-07-17 DIAGNOSIS — K58.2 IRRITABLE BOWEL SYNDROME WITH BOTH CONSTIPATION AND DIARRHEA: ICD-10-CM

## 2025-07-17 DIAGNOSIS — K21.00 GASTROESOPHAGEAL REFLUX DISEASE WITH ESOPHAGITIS WITHOUT HEMORRHAGE: ICD-10-CM

## 2025-07-17 DIAGNOSIS — Z12.11 SCREENING FOR COLON CANCER: ICD-10-CM

## 2025-07-17 PROCEDURE — 99214 OFFICE O/P EST MOD 30 MIN: CPT | Performed by: NURSE PRACTITIONER

## 2025-07-17 NOTE — ASSESSMENT & PLAN NOTE
During today's office visit, I reviewed the patient's previous blood work results from March and since they were normal, we will proceed with repeat blood work in September.  We will contact the patient once we have the results of the blood work to review the results and discuss any other treatment plan recommendations.  The patient was agreeable and verbalized an understanding.    NAFLD Fibrosis Score is: -2.072    NAFLD Score Correlated Fibrosis Severity   <-1.455 F0-F2   -1.455-0.676 Indeterminate Score   >0.676 F3-F4   **Fibrosis Severity Scale: F0 = no fibrosis; F1= mild fibrosis; F2 = moderate fibrosis; F3 = severe fibrosis; F4 = cirrhosis    NAFLD Score Component Values:  Component Value Date   Age: 55 y.o.     BMI: 20.86 kg/m²    IFG or DM: No    AST: 16 U/L 3/18/2025   ALT: 17 U/L 3/18/2025   Platelet: 184 Thousands/uL 3/18/2025   Albumin: 4.5 g/dL 3/18/2025        Fibrosis-4 (FIB-4) Score is: 1.18    Indication for testing Absence of advanced fibrosis Presence of advanced fibrosis Indeterminate result   NAFLD <1.30 >2.67 1.30-2.67   Hepatitis C <1.45 >3.25 1.45-3.25   Hepatitis B <1.00 >2.65 1.00-2.65     FIB-4 Score Component Values:  Component Value Date   Age: 55 y.o.     AST: 16 U/L 3/18/2025   Platelet: 184 Thousands/uL 3/18/2025   ALT: 17 U/L 3/18/2025        Last Liver Imaging: 3/14/24 right upper quadrant ultrasound and elastography.  Repeat Imaging Ordered: Yes    Continue current diet/lifestyle modification.  Continue to eat lower fat, higher fiber foods on a more regular daily basis.   Continue to keep alcohol consumption to a minimum.   Continue to work on work on adequate management of contributing health issues such as: high blood pressure, high cholesterol, diabetes, etc.     Orders:  •  CBC and differential; Future  •  Comprehensive metabolic panel; Future  •  Protime-INR; Future  •  Bilirubin, direct; Future  •  US right upper quadrant; Future

## 2025-07-17 NOTE — ASSESSMENT & PLAN NOTE
Stable    Continue to use over-the-counter famotidine as needed for any GERD symptoms.  Continue to avoid acidic or trigger foods much as possible.

## 2025-07-17 NOTE — ASSESSMENT & PLAN NOTE
Consider utilizing MiraLAX or Senokot as needed until your bowels get back into a regular predictable regimen or rhythm with having a bowel movement at least every other day, if not daily that is relieving.  Continue to drink at least 32 to 64 ounces of water daily.

## 2025-07-17 NOTE — PATIENT INSTRUCTIONS
Continue to watch bowel movements and do not go past 2-3 days without a BM.   Can use Senkot, 1 pill every other night if you have not have a BM in 2-3 days and then can back off once you feel you are back in a better rhythm.   Continue to drink at least 64 oz of water daily.   Continue to avoid acidic or trigger foods as much as possible.   Continue to use OTC famotidine as needed for any GERD symptoms.   Proceed with blood work in September.   Proceed with RUQ U/S.   Discussed recommendations in regards to fatty liver including:   Strict control of contributing comorbidities (obesity, prediabetes/diabetes, hypertension, and hypertriglyceridemia).  Weight loss of approx 10-15% of patient's current body weight over a period of 6-12 months through low fat diet and cardiovascular exercise as tolerated.   Limiting alcohol consumption, preferably complete abstinence.  Monitor hepatic function every 6 months with routine labs.   Continue to watch for red flag symptoms.   Schedule a f/u OV in 1 year.     We did review GI red flag symptoms, including, but not limited to: chronic nausea, vomiting, diarrhea, chills, fever, and unintentional weight loss and should call or contact our office with any changes or concerns. I reviewed with the patient that if they notice any blood while vomiting or in their stool they should contact or office or go to the nearest emergency room for immediate evaluation. The patient was agreeable and verbalized an understanding.

## 2025-07-17 NOTE — PROGRESS NOTES
Name: Nathalie Londono      : 1970      MRN: [id number]  Encounter Provider: NICKOLAS Barber  Encounter Date: 2025   Encounter department: [facility]  :  Assessment & Plan  Steatosis of liver  During today's office visit, I reviewed the patient's previous blood work results from March and since they were normal, we will proceed with repeat blood work in September.  We will contact the patient once we have the results of the blood work to review the results and discuss any other treatment plan recommendations.  The patient was agreeable and verbalized an understanding.    NAFLD Fibrosis Score is: -2.072    NAFLD Score Correlated Fibrosis Severity   <-1.455 F0-F2   -1.455-0.676 Indeterminate Score   >0.676 F3-F4   **Fibrosis Severity Scale: F0 = no fibrosis; F1= mild fibrosis; F2 = moderate fibrosis; F3 = severe fibrosis; F4 = cirrhosis    NAFLD Score Component Values:  Component Value Date   Age: 55 y.o.     BMI: 20.86 kg/m²    IFG or DM: No    AST: 16 U/L 3/18/2025   ALT: 17 U/L 3/18/2025   Platelet: 184 Thousands/uL 3/18/2025   Albumin: 4.5 g/dL 3/18/2025        Fibrosis-4 (FIB-4) Score is: 1.18    Indication for testing Absence of advanced fibrosis Presence of advanced fibrosis Indeterminate result   NAFLD <1.30 >2.67 1.30-2.67   Hepatitis C <1.45 >3.25 1.45-3.25   Hepatitis B <1.00 >2.65 1.00-2.65     FIB-4 Score Component Values:  Component Value Date   Age: 55 y.o.     AST: 16 U/L 3/18/2025   Platelet: 184 Thousands/uL 3/18/2025   ALT: 17 U/L 3/18/2025        Last Liver Imaging: 3/14/24 right upper quadrant ultrasound and elastography.  Repeat Imaging Ordered: Yes    Continue current diet/lifestyle modification.  Continue to eat lower fat, higher fiber foods on a more regular daily basis.   Continue to keep alcohol consumption to a minimum.   Continue to work on work on adequate management of contributing health issues such as: high blood pressure, high  cholesterol, diabetes, etc.     Orders:  •  CBC and differential; Future  •  Comprehensive metabolic panel; Future  •  Protime-INR; Future  •  Bilirubin, direct; Future  •  US right upper quadrant; Future    Gastroesophageal reflux disease with esophagitis without hemorrhage  Stable    Continue to use over-the-counter famotidine as needed for any GERD symptoms.  Continue to avoid acidic or trigger foods much as possible.       Irritable bowel syndrome with both constipation and diarrhea  Consider utilizing MiraLAX or Senokot as needed until your bowels get back into a regular predictable regimen or rhythm with having a bowel movement at least every other day, if not daily that is relieving.  Continue to drink at least 32 to 64 ounces of water daily.       Screening for colon cancer  We will hold off on any repeat colonoscopies until the recommended surveillance date unless the patient would start to develop red flag symptoms in the future.  The patient was agreeable and verbalized an understanding.  DUE: 7/2/29    Reviewed GI red flag symptoms with patient today.         The patient is to schedule a follow up office visit in 1 year, but understands to call or contact our offices with any issues before then or as needed.     History of Present Illness   Nathalie Londono is a 55 y.o. female who presents today for a follow-up office visit regarding her steatosis of the liver, GERD symptoms, and irritable bowel syndrome with both constipation and diarrhea.  The patient reports that in general even though she just did have a slight flareup of GERD symptoms after eating apples with peanut butter daily for some time, she Majo back on over-the-counter famotidine for 2 weeks and then her symptoms completely resolved.  The patient is not sure if the symptoms were GERD symptoms or possible reaction and has been trying to try the apple separately and peanut butter separately but has not seen any issues except for when she eats  "them together.    She does report that she has actually been able to gain a few pounds back because he was concerned with the weight loss and her weight has remained stable.    She does report that although she does not feel constipated, she is currently only having a bowel movement every 2 to 3 days, but that it is usually a much larger bowel movement than normal, not relieving, and is not having any straining or issues.  She reports she has not changed her diet and is definitely drinking enough water.    The patient currently denies any reflux, nausea, dysphagia, vomiting, decreased appetite, unplanned weight loss, or abdominal pain. Water Intake: Adequate amounts per day.    The patient currently reports that they have a BM every 2-3 days and reports that it is relieving, without any consistent diarrhea, constipation, straining, melena or bloody stools. Last BM: Today. Flatus: Minimal, depending on what she is eating.    GI Meds: Over-the-counter famotidine as needed.  Daily NSAID Use: Denied    Tobacco: Denied  ETOH: Denied  Marijuana: Denied  Illicit Drug Use: Denied    Imaging: (None):     Endoscopy History: EGD: (2/8/23): Ten or more sessile fundic gland polyps measuring less than 5 mm. Path: Mild esophagitis     COLONOSCOPY: (7/2/19): Normal. Random biopsies obtained, normal.     DUE: 7/2/29    HPI  History obtained from: patient  Review of Systems A complete review of systems is negative other than that noted above in the HPI.      Current Medications[1]  Objective   /82 (BP Location: Left arm, Patient Position: Sitting, Cuff Size: Standard)   Pulse 80   Temp 98.8 °F (37.1 °C) (Temporal)   Ht 5' 6.5\" (1.689 m)   Wt 59.5 kg (131 lb 3.2 oz)   SpO2 97%   BMI 20.86 kg/m²     Physical Exam   Abdomen is soft, nondistended, nontender, with hypoactive bowel sounds x 4.  No edema noted of the b/l lower extremities upon exam today. Skin is non-icteric.     Lab Results: I personally reviewed relevant lab " results.       Results for orders placed during the hospital encounter of 02/08/23    EGD    Impression  Z-line 40 cm from the incisors  The esophagus appeared normal. Performed random biopsy using biopsy forceps to rule out eosinophilic esophagitis.  Ten or more sessile fundic gland polyps measuring smaller than 5 mm  Performed multiple forceps biopsies to rule out H. pylori in the stomach  The duodenum appeared normal.    RECOMMENDATION:  Await pathology results            Alec Levin MD                 [1]  Current Outpatient Medications   Medication Sig Dispense Refill   • atorvastatin (LIPITOR) 10 mg tablet      • Cholecalciferol (VITAMIN D3) 125 MCG (5000 UT) CAPS      • Cyanocobalamin 1000 MCG/ML KIT Inject 1,000 mcg into a muscle every 30 (thirty) days     • estradiol (VAGIFEM, YUVAFEM) 10 MCG TABS vaginal tablet Insert into the vagina once a week     • FLUoxetine (PROzac) 20 mg capsule Take 20 mg by mouth in the morning.     • levothyroxine 25 mcg tablet        No current facility-administered medications for this visit.

## 2025-07-28 ENCOUNTER — HOSPITAL ENCOUNTER (OUTPATIENT)
Dept: ULTRASOUND IMAGING | Facility: HOSPITAL | Age: 55
Discharge: HOME | End: 2025-07-28
Payer: COMMERCIAL

## 2025-07-28 DIAGNOSIS — K76.0 STEATOSIS OF LIVER: ICD-10-CM

## 2025-07-28 PROCEDURE — 76705 ECHO EXAM OF ABDOMEN: CPT
